# Patient Record
Sex: FEMALE | Race: WHITE | NOT HISPANIC OR LATINO | Employment: OTHER | URBAN - METROPOLITAN AREA
[De-identification: names, ages, dates, MRNs, and addresses within clinical notes are randomized per-mention and may not be internally consistent; named-entity substitution may affect disease eponyms.]

---

## 2023-04-25 ENCOUNTER — OFFICE VISIT (OUTPATIENT)
Dept: PRIMARY CARE | Facility: CLINIC | Age: 88
End: 2023-04-25
Payer: MEDICARE

## 2023-04-25 VITALS
TEMPERATURE: 97.1 F | WEIGHT: 124 LBS | DIASTOLIC BLOOD PRESSURE: 84 MMHG | SYSTOLIC BLOOD PRESSURE: 182 MMHG | BODY MASS INDEX: 24.22 KG/M2

## 2023-04-25 DIAGNOSIS — F32.5 MAJOR DEPRESSIVE DISORDER IN REMISSION, UNSPECIFIED WHETHER RECURRENT (CMS-HCC): ICD-10-CM

## 2023-04-25 DIAGNOSIS — I10 BENIGN HYPERTENSION: ICD-10-CM

## 2023-04-25 DIAGNOSIS — I48.0 PAROXYSMAL ATRIAL FIBRILLATION (MULTI): ICD-10-CM

## 2023-04-25 DIAGNOSIS — E78.5 HYPERLIPIDEMIA, UNSPECIFIED HYPERLIPIDEMIA TYPE: Primary | ICD-10-CM

## 2023-04-25 DIAGNOSIS — B02.29 POST HERPETIC NEURALGIA: ICD-10-CM

## 2023-04-25 DIAGNOSIS — R42 ORTHOSTATIC DIZZINESS: ICD-10-CM

## 2023-04-25 DIAGNOSIS — M81.0 OSTEOPOROSIS, UNSPECIFIED OSTEOPOROSIS TYPE, UNSPECIFIED PATHOLOGICAL FRACTURE PRESENCE: ICD-10-CM

## 2023-04-25 PROBLEM — H81.10 BENIGN POSITIONAL VERTIGO: Status: RESOLVED | Noted: 2023-04-25 | Resolved: 2023-04-25

## 2023-04-25 PROBLEM — R26.89 DECREASED MOBILITY: Status: RESOLVED | Noted: 2023-04-25 | Resolved: 2023-04-25

## 2023-04-25 PROBLEM — E66.3 OVERWEIGHT (BMI 25.0-29.9): Status: ACTIVE | Noted: 2023-04-25

## 2023-04-25 PROCEDURE — 1036F TOBACCO NON-USER: CPT | Performed by: FAMILY MEDICINE

## 2023-04-25 PROCEDURE — 36415 COLL VENOUS BLD VENIPUNCTURE: CPT | Performed by: FAMILY MEDICINE

## 2023-04-25 PROCEDURE — 99214 OFFICE O/P EST MOD 30 MIN: CPT | Performed by: FAMILY MEDICINE

## 2023-04-25 PROCEDURE — 3077F SYST BP >= 140 MM HG: CPT | Performed by: FAMILY MEDICINE

## 2023-04-25 PROCEDURE — 80048 BASIC METABOLIC PNL TOTAL CA: CPT

## 2023-04-25 PROCEDURE — 3079F DIAST BP 80-89 MM HG: CPT | Performed by: FAMILY MEDICINE

## 2023-04-25 PROCEDURE — 85027 COMPLETE CBC AUTOMATED: CPT

## 2023-04-25 RX ORDER — LATANOPROST 50 UG/ML
1 SOLUTION/ DROPS OPHTHALMIC NIGHTLY
COMMUNITY

## 2023-04-25 RX ORDER — SIMVASTATIN 20 MG/1
20 TABLET, FILM COATED ORAL NIGHTLY
COMMUNITY
End: 2023-04-25 | Stop reason: SDUPTHER

## 2023-04-25 RX ORDER — ATENOLOL 50 MG/1
50 TABLET ORAL DAILY
Qty: 90 TABLET | Refills: 3 | Status: SHIPPED | OUTPATIENT
Start: 2023-04-25 | End: 2023-12-06 | Stop reason: SDUPTHER

## 2023-04-25 RX ORDER — BRIMONIDINE TARTRATE AND TIMOLOL MALEATE 2; 5 MG/ML; MG/ML
1 SOLUTION OPHTHALMIC EVERY 12 HOURS SCHEDULED
COMMUNITY
Start: 2023-02-13

## 2023-04-25 RX ORDER — SIMVASTATIN 20 MG/1
20 TABLET, FILM COATED ORAL NIGHTLY
Qty: 90 TABLET | Refills: 3 | Status: SHIPPED | OUTPATIENT
Start: 2023-04-25 | End: 2023-12-05 | Stop reason: SDUPTHER

## 2023-04-25 RX ORDER — PREGABALIN 100 MG/1
1 CAPSULE ORAL 2 TIMES DAILY
COMMUNITY
Start: 2023-01-25 | End: 2023-04-25 | Stop reason: DRUGHIGH

## 2023-04-25 RX ORDER — ATENOLOL 50 MG/1
50 TABLET ORAL DAILY
COMMUNITY
End: 2023-04-25 | Stop reason: SDUPTHER

## 2023-04-25 RX ORDER — PREGABALIN 150 MG/1
150 CAPSULE ORAL 2 TIMES DAILY
Qty: 180 CAPSULE | Refills: 0 | Status: SHIPPED | OUTPATIENT
Start: 2023-04-25 | End: 2023-06-07 | Stop reason: DRUGHIGH

## 2023-04-25 RX ORDER — LISINOPRIL 40 MG/1
40 TABLET ORAL DAILY
COMMUNITY
End: 2023-04-25 | Stop reason: SDUPTHER

## 2023-04-25 RX ORDER — PREGABALIN 100 MG/1
100 CAPSULE ORAL 2 TIMES DAILY
Qty: 90 CAPSULE | Refills: 0 | Status: CANCELLED | OUTPATIENT
Start: 2023-04-25

## 2023-04-25 RX ORDER — LISINOPRIL 40 MG/1
40 TABLET ORAL DAILY
Qty: 90 TABLET | Refills: 3 | Status: SHIPPED | OUTPATIENT
Start: 2023-04-25 | End: 2023-12-05 | Stop reason: SDUPTHER

## 2023-04-25 RX ORDER — WARFARIN SODIUM 5 MG/1
5 TABLET ORAL EVERY EVENING
Qty: 90 TABLET | Refills: 3 | Status: SHIPPED | OUTPATIENT
Start: 2023-04-25 | End: 2023-12-05 | Stop reason: SDUPTHER

## 2023-04-25 RX ORDER — WARFARIN SODIUM 5 MG/1
5 TABLET ORAL
COMMUNITY
End: 2023-04-25 | Stop reason: SDUPTHER

## 2023-04-25 ASSESSMENT — PATIENT HEALTH QUESTIONNAIRE - PHQ9
2. FEELING DOWN, DEPRESSED OR HOPELESS: NOT AT ALL
SUM OF ALL RESPONSES TO PHQ9 QUESTIONS 1 AND 2: 0
1. LITTLE INTEREST OR PLEASURE IN DOING THINGS: NOT AT ALL

## 2023-04-25 NOTE — PROGRESS NOTES
Chief complaint:   Chief Complaint   Patient presents with    Follow-up     3 month       HPI:  Estee Medina is a 87 y.o. female who presents for evaluation of     Home -135/60-70s    She gets anxious when she leaves the house.     Physical exam:  BP (!) 182/84 (BP Location: Left arm, Patient Position: Sitting)   Temp 36.2 °C (97.1 °F)   Wt 56.2 kg (124 lb)   BMI 24.22 kg/m²   General: NAD, well appearing female  Heart: RRR, no mumur appreciated  Lungs: CTAB, no wheezes, rales, rhonchi  Abdomen: soft, non tender, normoactive BS, no organomegaly  Extremities: No LE edema    Assessment/Plan   Problem List Items Addressed This Visit          Nervous    Post herpetic neuralgia     4/25/2023  - increase Lyrica from 100 mg PO BID to 150 mg PO BID  - controlled substance agreement updated today  - last urine drug screen 6/1/2022         Relevant Medications    pregabalin (Lyrica) 150 mg capsule       Circulatory    Paroxysmal atrial fibrillation (CMS/HCC)     4/25/2023  - continue Warfarin and monitoring at the anticoagulation clinic  - CBC ordered today for screening for anemia         Relevant Medications    warfarin (Coumadin) 5 mg tablet    atenolol (Tenormin) 50 mg tablet    Other Relevant Orders    CBC    Benign hypertension     4/25/2023  - elevated in office but at goal at home  - continue current medication therapy  - non fasting BMP ordered today         Relevant Medications    lisinopril 40 mg tablet    atenolol (Tenormin) 50 mg tablet    Other Relevant Orders    Basic metabolic panel       Musculoskeletal    Osteoporosis       Other    Major depressive disorder in remission, unspecified whether recurrent (CMS/HCC)    Hyperlipidemia - Primary     - continue current medication therapy         Relevant Medications    simvastatin (Zocor) 20 mg tablet    Orthostatic dizziness       Breanna Herrera DO

## 2023-04-25 NOTE — ASSESSMENT & PLAN NOTE
4/25/2023  - increase Lyrica from 100 mg PO BID to 150 mg PO BID  - controlled substance agreement updated today  - last urine drug screen 6/1/2022

## 2023-04-25 NOTE — ASSESSMENT & PLAN NOTE
4/25/2023  - elevated in office but at goal at home  - continue current medication therapy  - non fasting BMP ordered today

## 2023-04-25 NOTE — ASSESSMENT & PLAN NOTE
4/25/2023  - continue Warfarin and monitoring at the anticoagulation clinic  - CBC ordered today for screening for anemia

## 2023-04-26 LAB
ANION GAP IN SER/PLAS: 16 MMOL/L (ref 10–20)
CALCIUM (MG/DL) IN SER/PLAS: 9.7 MG/DL (ref 8.6–10.6)
CARBON DIOXIDE, TOTAL (MMOL/L) IN SER/PLAS: 22 MMOL/L (ref 21–32)
CHLORIDE (MMOL/L) IN SER/PLAS: 106 MMOL/L (ref 98–107)
CREATININE (MG/DL) IN SER/PLAS: 0.55 MG/DL (ref 0.5–1.05)
ERYTHROCYTE DISTRIBUTION WIDTH (RATIO) BY AUTOMATED COUNT: 12.7 % (ref 11.5–14.5)
ERYTHROCYTE MEAN CORPUSCULAR HEMOGLOBIN CONCENTRATION (G/DL) BY AUTOMATED: 33.2 G/DL (ref 32–36)
ERYTHROCYTE MEAN CORPUSCULAR VOLUME (FL) BY AUTOMATED COUNT: 95 FL (ref 80–100)
ERYTHROCYTES (10*6/UL) IN BLOOD BY AUTOMATED COUNT: 4.65 X10E12/L (ref 4–5.2)
GFR FEMALE: 89 ML/MIN/1.73M2
GLUCOSE (MG/DL) IN SER/PLAS: 93 MG/DL (ref 74–99)
HEMATOCRIT (%) IN BLOOD BY AUTOMATED COUNT: 44.3 % (ref 36–46)
HEMOGLOBIN (G/DL) IN BLOOD: 14.7 G/DL (ref 12–16)
LEUKOCYTES (10*3/UL) IN BLOOD BY AUTOMATED COUNT: 7 X10E9/L (ref 4.4–11.3)
PLATELETS (10*3/UL) IN BLOOD AUTOMATED COUNT: 253 X10E9/L (ref 150–450)
POTASSIUM (MMOL/L) IN SER/PLAS: 4.2 MMOL/L (ref 3.5–5.3)
SODIUM (MMOL/L) IN SER/PLAS: 140 MMOL/L (ref 136–145)
UREA NITROGEN (MG/DL) IN SER/PLAS: 16 MG/DL (ref 6–23)

## 2023-04-27 ENCOUNTER — TELEPHONE (OUTPATIENT)
Dept: PRIMARY CARE | Facility: CLINIC | Age: 88
End: 2023-04-27
Payer: MEDICARE

## 2023-04-27 NOTE — TELEPHONE ENCOUNTER
----- Message from Breanna Herrera DO sent at 4/27/2023  8:16 AM EDT -----  Labs look good- no anemia, normal kidney/liver function, electrolytes, blood sugar

## 2023-05-18 ENCOUNTER — TELEPHONE (OUTPATIENT)
Dept: PRIMARY CARE | Facility: CLINIC | Age: 88
End: 2023-05-18
Payer: MEDICARE

## 2023-05-18 NOTE — TELEPHONE ENCOUNTER
Pt is calling she states [facility] contacted her and told her she needs a Prevnar 20 pnemonia vaccine, Pt states that she has had these pnemonia vaccines.    Pnemovax in 2008  Prevnar 13 in 2016     Pt would like to know if you think she should get the Prevnar 20? Please let her know thank you.

## 2023-06-06 ENCOUNTER — TELEPHONE (OUTPATIENT)
Dept: PRIMARY CARE | Facility: CLINIC | Age: 88
End: 2023-06-06
Payer: MEDICARE

## 2023-06-06 NOTE — TELEPHONE ENCOUNTER
She was seen 04/25/23 and her Lyrica was increased. She says she has had swelling in her lower legs and ankles since then. What should she do? Please call.

## 2023-06-07 ENCOUNTER — OFFICE VISIT (OUTPATIENT)
Dept: PRIMARY CARE | Facility: CLINIC | Age: 88
End: 2023-06-07
Payer: MEDICARE

## 2023-06-07 ENCOUNTER — TELEPHONE (OUTPATIENT)
Dept: PRIMARY CARE | Facility: CLINIC | Age: 88
End: 2023-06-07

## 2023-06-07 VITALS
DIASTOLIC BLOOD PRESSURE: 76 MMHG | TEMPERATURE: 97.8 F | BODY MASS INDEX: 25.97 KG/M2 | WEIGHT: 133 LBS | SYSTOLIC BLOOD PRESSURE: 160 MMHG

## 2023-06-07 DIAGNOSIS — B02.29 POST HERPETIC NEURALGIA: Primary | ICD-10-CM

## 2023-06-07 DIAGNOSIS — R60.0 LOCALIZED EDEMA: ICD-10-CM

## 2023-06-07 LAB
POC APPEARANCE, URINE: CLEAR
POC BILIRUBIN, URINE: NEGATIVE
POC BLOOD, URINE: NEGATIVE
POC COLOR, URINE: YELLOW
POC GLUCOSE, URINE: NEGATIVE MG/DL
POC KETONES, URINE: NEGATIVE MG/DL
POC LEUKOCYTES, URINE: NEGATIVE
POC NITRITE,URINE: NEGATIVE
POC PH, URINE: 6 PH
POC PROTEIN, URINE: NEGATIVE MG/DL
POC SPECIFIC GRAVITY, URINE: 1.01
POC UROBILINOGEN, URINE: 0.2 EU/DL

## 2023-06-07 PROCEDURE — 80053 COMPREHEN METABOLIC PANEL: CPT

## 2023-06-07 PROCEDURE — 3077F SYST BP >= 140 MM HG: CPT | Performed by: FAMILY MEDICINE

## 2023-06-07 PROCEDURE — 3078F DIAST BP <80 MM HG: CPT | Performed by: FAMILY MEDICINE

## 2023-06-07 PROCEDURE — 1036F TOBACCO NON-USER: CPT | Performed by: FAMILY MEDICINE

## 2023-06-07 PROCEDURE — 81003 URINALYSIS AUTO W/O SCOPE: CPT | Performed by: FAMILY MEDICINE

## 2023-06-07 PROCEDURE — 1159F MED LIST DOCD IN RCRD: CPT | Performed by: FAMILY MEDICINE

## 2023-06-07 PROCEDURE — 85027 COMPLETE CBC AUTOMATED: CPT

## 2023-06-07 PROCEDURE — 99214 OFFICE O/P EST MOD 30 MIN: CPT | Performed by: FAMILY MEDICINE

## 2023-06-07 PROCEDURE — 84443 ASSAY THYROID STIM HORMONE: CPT

## 2023-06-07 RX ORDER — PREGABALIN 100 MG/1
100 CAPSULE ORAL 2 TIMES DAILY
Qty: 180 CAPSULE | Refills: 0 | Status: SHIPPED | OUTPATIENT
Start: 2023-06-07 | End: 2023-07-25

## 2023-06-07 ASSESSMENT — PATIENT HEALTH QUESTIONNAIRE - PHQ9
2. FEELING DOWN, DEPRESSED OR HOPELESS: NOT AT ALL
1. LITTLE INTEREST OR PLEASURE IN DOING THINGS: NOT AT ALL
SUM OF ALL RESPONSES TO PHQ9 QUESTIONS 1 AND 2: 0

## 2023-06-07 NOTE — PROGRESS NOTES
Chief complaint:   Chief Complaint   Patient presents with    Joint Swelling       HPI:  Estee Medina is a 88 y.o. female who presents for evaluation of leg edema which has slowly worsened since increase in Lyrica from 100 mg PO BID to 150 mg PO BID. No chest pain, SOB, coughing, change in urination, abdominal pain, swelling anywhere else, or GI sx. Her swelling is worse starting in the afternoon and improved overnight.    Physical exam:  /76 (BP Location: Left arm, Patient Position: Sitting)   Temp 36.6 °C (97.8 °F)   Wt 60.3 kg (133 lb)   BMI 25.97 kg/m²   General: NAD, well appearing female  Heart: RRR, no mumur appreciated  Lungs: CTAB, no wheezes, rales, rhonchi  Abdomen: soft, non tender, normoactive BS, no organomegaly  Extremities: Bilateral LE edema +2    Assessment/Plan   Problem List Items Addressed This Visit          Nervous    Post herpetic neuralgia - Primary    Relevant Medications    pregabalin (Lyrica) 100 mg capsule     Other Visit Diagnoses       Localized edema        Relevant Orders    POCT UA Automated manually resulted (Completed)    Comprehensive metabolic panel    CBC    Tsh With Reflex To Free T4 If Abnormal        Decrease dose back to 100 mg PO BID of Lyrica. Discussed alternative potential causes of swelling. She has no systemic sx but will order labs and reviewed UA which was wnl today.    Breanna Herrera DO

## 2023-06-08 LAB
ALANINE AMINOTRANSFERASE (SGPT) (U/L) IN SER/PLAS: 16 U/L (ref 7–45)
ALBUMIN (G/DL) IN SER/PLAS: 4.2 G/DL (ref 3.4–5)
ALKALINE PHOSPHATASE (U/L) IN SER/PLAS: 82 U/L (ref 33–136)
ANION GAP IN SER/PLAS: 13 MMOL/L (ref 10–20)
ASPARTATE AMINOTRANSFERASE (SGOT) (U/L) IN SER/PLAS: 21 U/L (ref 9–39)
BILIRUBIN TOTAL (MG/DL) IN SER/PLAS: 0.6 MG/DL (ref 0–1.2)
CALCIUM (MG/DL) IN SER/PLAS: 9.5 MG/DL (ref 8.6–10.6)
CARBON DIOXIDE, TOTAL (MMOL/L) IN SER/PLAS: 22 MMOL/L (ref 21–32)
CHLORIDE (MMOL/L) IN SER/PLAS: 108 MMOL/L (ref 98–107)
CREATININE (MG/DL) IN SER/PLAS: 0.57 MG/DL (ref 0.5–1.05)
ERYTHROCYTE DISTRIBUTION WIDTH (RATIO) BY AUTOMATED COUNT: 13.2 % (ref 11.5–14.5)
ERYTHROCYTE MEAN CORPUSCULAR HEMOGLOBIN CONCENTRATION (G/DL) BY AUTOMATED: 32.3 G/DL (ref 32–36)
ERYTHROCYTE MEAN CORPUSCULAR VOLUME (FL) BY AUTOMATED COUNT: 95 FL (ref 80–100)
ERYTHROCYTES (10*6/UL) IN BLOOD BY AUTOMATED COUNT: 4.57 X10E12/L (ref 4–5.2)
GFR FEMALE: 88 ML/MIN/1.73M2
GLUCOSE (MG/DL) IN SER/PLAS: 89 MG/DL (ref 74–99)
HEMATOCRIT (%) IN BLOOD BY AUTOMATED COUNT: 43.4 % (ref 36–46)
HEMOGLOBIN (G/DL) IN BLOOD: 14 G/DL (ref 12–16)
LEUKOCYTES (10*3/UL) IN BLOOD BY AUTOMATED COUNT: 5.5 X10E9/L (ref 4.4–11.3)
PLATELETS (10*3/UL) IN BLOOD AUTOMATED COUNT: 234 X10E9/L (ref 150–450)
POTASSIUM (MMOL/L) IN SER/PLAS: 4.3 MMOL/L (ref 3.5–5.3)
PROTEIN TOTAL: 6.4 G/DL (ref 6.4–8.2)
SODIUM (MMOL/L) IN SER/PLAS: 139 MMOL/L (ref 136–145)
UREA NITROGEN (MG/DL) IN SER/PLAS: 16 MG/DL (ref 6–23)

## 2023-06-09 ENCOUNTER — TELEPHONE (OUTPATIENT)
Dept: PRIMARY CARE | Facility: CLINIC | Age: 88
End: 2023-06-09
Payer: MEDICARE

## 2023-06-09 LAB — THYROTROPIN (MIU/L) IN SER/PLAS BY DETECTION LIMIT <= 0.05 MIU/L: 1.09 MIU/L (ref 0.44–3.98)

## 2023-06-16 ENCOUNTER — TELEPHONE (OUTPATIENT)
Dept: PRIMARY CARE | Facility: CLINIC | Age: 88
End: 2023-06-16
Payer: MEDICARE

## 2023-06-16 NOTE — TELEPHONE ENCOUNTER
----- Message from Linnette Bailey sent at 6/16/2023  8:18 AM EDT -----      ----- Message -----  From: Breanna Herrera DO  Sent: 6/13/2023   5:26 PM EDT  To: Anita Edwards MA    Thyroid level ok

## 2023-06-26 PROBLEM — F32.A ANXIETY AND DEPRESSION: Status: ACTIVE | Noted: 2023-06-26

## 2023-06-26 PROBLEM — F41.9 ANXIETY AND DEPRESSION: Status: ACTIVE | Noted: 2023-06-26

## 2023-07-25 ENCOUNTER — OFFICE VISIT (OUTPATIENT)
Dept: PRIMARY CARE | Facility: CLINIC | Age: 88
End: 2023-07-25
Payer: MEDICARE

## 2023-07-25 VITALS — DIASTOLIC BLOOD PRESSURE: 70 MMHG | SYSTOLIC BLOOD PRESSURE: 140 MMHG

## 2023-07-25 DIAGNOSIS — H92.09 EAR ACHE: Primary | ICD-10-CM

## 2023-07-25 DIAGNOSIS — B02.29 POST HERPETIC NEURALGIA: ICD-10-CM

## 2023-07-25 PROCEDURE — 99213 OFFICE O/P EST LOW 20 MIN: CPT | Performed by: FAMILY MEDICINE

## 2023-07-25 PROCEDURE — 3078F DIAST BP <80 MM HG: CPT | Performed by: FAMILY MEDICINE

## 2023-07-25 PROCEDURE — 1159F MED LIST DOCD IN RCRD: CPT | Performed by: FAMILY MEDICINE

## 2023-07-25 PROCEDURE — 1036F TOBACCO NON-USER: CPT | Performed by: FAMILY MEDICINE

## 2023-07-25 PROCEDURE — 3077F SYST BP >= 140 MM HG: CPT | Performed by: FAMILY MEDICINE

## 2023-07-25 RX ORDER — PREGABALIN 100 MG/1
100 CAPSULE ORAL 2 TIMES DAILY
Qty: 180 CAPSULE | Refills: 0
Start: 2023-07-25 | End: 2023-09-16 | Stop reason: DRUGHIGH

## 2023-07-29 NOTE — PROGRESS NOTES
Chief complaint:   Chief Complaint   Patient presents with    Follow-up     Ankle swelling    Earache     Right ear       HPI:  Estee Medina is a 88 y.o. female who presents for evaluation of lyrica. She notes some improvement with the higher dose but had some leg swelling which resolved when the dose was again decreased. She also has right earache.     Physical exam:  /70   General: NAD, well appearing female  Ears: TM normal bilaterally  Heart: RRR, no mumur appreciated  Lungs: CTAB, no wheezes, rales, rhonchi  Abdomen: soft, non tender, normoactive BS, no organomegaly  Extremities: No LE edema    Assessment/Plan   Problem List Items Addressed This Visit       Post herpetic neuralgia    Relevant Medications    pregabalin (Lyrica) 100 mg capsule     Other Visit Diagnoses       Ear ache    -  Primary        Reassurance provided for her ear ache  Will continue Lyrica at 100 mg PO daily and she will try Tylenol. If that is not helping, advised her she can call and we can again trial an increase but if her swelling returns will recommend decreasing again.     Breanna Herrera, DO

## 2023-09-16 ENCOUNTER — TELEPHONE (OUTPATIENT)
Dept: PRIMARY CARE | Facility: CLINIC | Age: 88
End: 2023-09-16
Payer: MEDICARE

## 2023-09-16 DIAGNOSIS — B02.29 POST HERPETIC NEURALGIA: Primary | ICD-10-CM

## 2023-09-16 RX ORDER — PREGABALIN 150 MG/1
150 CAPSULE ORAL 2 TIMES DAILY
Qty: 180 CAPSULE | Refills: 0 | Status: SHIPPED | OUTPATIENT
Start: 2023-09-16 | End: 2023-12-05 | Stop reason: SDUPTHER

## 2023-09-16 NOTE — TELEPHONE ENCOUNTER
Pt requesting a refill of Lyrica 150mg, Pt states that the Jerri 100mg was not helping with her pain, Pt states that at her last appt she had discussed with you about going back to the 150mg due to her pain not being controlled on the 100mg     Please call Pt once this has been sent in    Lyrica 150mg BID  [phone]  Last refill 4/25/23 for 150mg and 7/25/23 for 100mg  Last appt 7/25/23  Next appt 10/25/23

## 2023-09-22 DIAGNOSIS — I48.91 ATRIAL FIBRILLATION, UNSPECIFIED TYPE (MULTI): Primary | ICD-10-CM

## 2023-09-22 LAB
INR IN PPP BY COAGULATION ASSAY EXTERNAL: 2.8
PROTHROMBIN TIME (PT) IN PPP BY COAGULATION ASSAY EXTERNAL: NORMAL SECONDS

## 2023-10-16 ENCOUNTER — APPOINTMENT (OUTPATIENT)
Dept: RADIOLOGY | Facility: HOSPITAL | Age: 88
End: 2023-10-16
Payer: MEDICARE

## 2023-10-16 ENCOUNTER — HOSPITAL ENCOUNTER (EMERGENCY)
Facility: HOSPITAL | Age: 88
Discharge: HOME | End: 2023-10-17
Attending: STUDENT IN AN ORGANIZED HEALTH CARE EDUCATION/TRAINING PROGRAM
Payer: MEDICARE

## 2023-10-16 VITALS
OXYGEN SATURATION: 97 % | SYSTOLIC BLOOD PRESSURE: 179 MMHG | HEART RATE: 74 BPM | DIASTOLIC BLOOD PRESSURE: 97 MMHG | RESPIRATION RATE: 18 BRPM | TEMPERATURE: 97 F | HEIGHT: 60 IN | BODY MASS INDEX: 26.5 KG/M2 | WEIGHT: 135 LBS

## 2023-10-16 DIAGNOSIS — I10 UNCONTROLLED HYPERTENSION: Primary | ICD-10-CM
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
## 2023-10-16 DIAGNOSIS — S42.291A OTHER CLOSED DISPLACED FRACTURE OF PROXIMAL END OF RIGHT HUMERUS, INITIAL ENCOUNTER: ICD-10-CM

## 2023-10-16 PROCEDURE — 29105 APPLICATION LONG ARM SPLINT: CPT | Mod: RT

## 2023-10-16 PROCEDURE — 70450 CT HEAD/BRAIN W/O DYE: CPT | Performed by: RADIOLOGY

## 2023-10-16 PROCEDURE — 72125 CT NECK SPINE W/O DYE: CPT | Performed by: RADIOLOGY

## 2023-10-16 PROCEDURE — 73060 X-RAY EXAM OF HUMERUS: CPT | Mod: RIGHT SIDE | Performed by: RADIOLOGY

## 2023-10-16 PROCEDURE — 73030 X-RAY EXAM OF SHOULDER: CPT | Mod: RIGHT SIDE | Performed by: RADIOLOGY

## 2023-10-16 PROCEDURE — 99285 EMERGENCY DEPT VISIT HI MDM: CPT | Performed by: STUDENT IN AN ORGANIZED HEALTH CARE EDUCATION/TRAINING PROGRAM

## 2023-10-16 PROCEDURE — S0119 ONDANSETRON 4 MG: HCPCS | Performed by: STUDENT IN AN ORGANIZED HEALTH CARE EDUCATION/TRAINING PROGRAM

## 2023-10-16 PROCEDURE — 73060 X-RAY EXAM OF HUMERUS: CPT | Mod: RT

## 2023-10-16 PROCEDURE — 72125 CT NECK SPINE W/O DYE: CPT

## 2023-10-16 PROCEDURE — 2500000005 HC RX 250 GENERAL PHARMACY W/O HCPCS: Performed by: STUDENT IN AN ORGANIZED HEALTH CARE EDUCATION/TRAINING PROGRAM

## 2023-10-16 PROCEDURE — 70450 CT HEAD/BRAIN W/O DYE: CPT

## 2023-10-16 PROCEDURE — 2500000004 HC RX 250 GENERAL PHARMACY W/ HCPCS (ALT 636 FOR OP/ED): Performed by: STUDENT IN AN ORGANIZED HEALTH CARE EDUCATION/TRAINING PROGRAM

## 2023-10-16 PROCEDURE — 73030 X-RAY EXAM OF SHOULDER: CPT | Mod: RT,FY

## 2023-10-16 PROCEDURE — 99285 EMERGENCY DEPT VISIT HI MDM: CPT | Mod: 25

## 2023-10-16 RX ORDER — ONDANSETRON 4 MG/1
4 TABLET, ORALLY DISINTEGRATING ORAL ONCE
Status: COMPLETED | OUTPATIENT
Start: 2023-10-16 | End: 2023-10-16

## 2023-10-16 RX ADMIN — ONDANSETRON 4 MG: 4 TABLET, ORALLY DISINTEGRATING ORAL at 22:35

## 2023-10-16 ASSESSMENT — LIFESTYLE VARIABLES
HAVE YOU EVER FELT YOU SHOULD CUT DOWN ON YOUR DRINKING: NO
HAVE PEOPLE ANNOYED YOU BY CRITICIZING YOUR DRINKING: NO
EVER HAD A DRINK FIRST THING IN THE MORNING TO STEADY YOUR NERVES TO GET RID OF A HANGOVER: NO
EVER FELT BAD OR GUILTY ABOUT YOUR DRINKING: NO
REASON UNABLE TO ASSESS: NO

## 2023-10-16 ASSESSMENT — COLUMBIA-SUICIDE SEVERITY RATING SCALE - C-SSRS
1. IN THE PAST MONTH, HAVE YOU WISHED YOU WERE DEAD OR WISHED YOU COULD GO TO SLEEP AND NOT WAKE UP?: NO
6. HAVE YOU EVER DONE ANYTHING, STARTED TO DO ANYTHING, OR PREPARED TO DO ANYTHING TO END YOUR LIFE?: NO
2. HAVE YOU ACTUALLY HAD ANY THOUGHTS OF KILLING YOURSELF?: NO

## 2023-10-16 ASSESSMENT — PAIN - FUNCTIONAL ASSESSMENT: PAIN_FUNCTIONAL_ASSESSMENT: 0-10

## 2023-10-16 ASSESSMENT — PAIN SCALES - GENERAL: PAINLEVEL_OUTOF10: 9

## 2023-10-17 ENCOUNTER — TELEPHONE (OUTPATIENT)
Dept: PRIMARY CARE | Facility: CLINIC | Age: 88
End: 2023-10-17
Payer: MEDICARE

## 2023-10-17 DIAGNOSIS — S42.201S CLOSED FRACTURE OF PROXIMAL END OF RIGHT HUMERUS, UNSPECIFIED FRACTURE MORPHOLOGY, SEQUELA: Primary | ICD-10-CM

## 2023-10-17 RX ORDER — ACETAMINOPHEN 325 MG/1
975 TABLET ORAL ONCE
Status: COMPLETED | OUTPATIENT
Start: 2023-10-17 | End: 2023-10-17

## 2023-10-17 RX ADMIN — ACETAMINOPHEN 975 MG: 325 TABLET ORAL at 00:36

## 2023-10-17 ASSESSMENT — PAIN SCALES - GENERAL: PAINLEVEL_OUTOF10: 8

## 2023-10-17 NOTE — TELEPHONE ENCOUNTER
NT pt-   She fell and broke her shoulder 10/16/23. She saw the ortho.    Daughter is requesting home health and order for walker.

## 2023-10-17 NOTE — ED PROVIDER NOTES
HPI   Chief Complaint   Patient presents with    Fall     Right shoulder pain       88-year-old female with history of paroxysmal A-fib on Coumadin, HTN, HLD, and anxiety/depression presenting to [facility] ED with a complaint of a fall.  She reports she stood up from her couch and tripped on her pillow landing on the side of her right arm at approximately 5 PM today.  Denies hitting her head or losing consciousness.  States that she was unable to get up in which her daughter was not able to reach her on the phone and called the ambulance. States she has felt nauseous after the fall but no vomiting. Denies lightheadedness, chest pain, confusion, or weakness.      History provided by:  Patient                      [place] Coma Scale Score: 15                  Patient History   Past Medical History:   Diagnosis Date    Compression fracture of lumbar vertebra (CMS/HCC) 12/08/2015    Neuralgia and neuritis, unspecified 07/07/2020    Acute neuritis    Personal history of (healed) traumatic fracture     History of fracture of upper extremity    Personal history of other (healed) physical injury and trauma 06/02/2016    History of insect sting    Personal history of other diseases of the nervous system and sense organs 09/06/2019    History of cataract    Personal history of other infectious and parasitic diseases 04/28/2021    History of herpes zoster     Past Surgical History:   Procedure Laterality Date    OTHER SURGICAL HISTORY  01/08/2019    Cataract surgery    OTHER SURGICAL HISTORY  01/08/2019    Arm surgery    OTHER SURGICAL HISTORY  01/08/2019    Tonsillectomy    OTHER SURGICAL HISTORY  01/08/2019    [place] tooth extraction     Family History   Problem Relation Name Age of Onset    Other (malignant neoplam) Mother      Diabetes Father      Diabetes Sister      Other (cardiac disorder) Sister      Alcohol abuse Daughter       Social History     Tobacco Use    Smoking status: Never     Passive exposure: Never     Smokeless tobacco: Never   Substance Use Topics    Alcohol use: Yes    Drug use: Never       Physical Exam   ED Triage Vitals [10/16/23 2152]   Temp Heart Rate Resp BP   36.1 °C (97 °F) 69 16 (!) 202/104      SpO2 Temp src Heart Rate Source Patient Position   95 % -- -- --      BP Location FiO2 (%)     -- --       Physical Exam  Vitals and nursing note reviewed.   Constitutional:       General: She is awake. She is not in acute distress.     Appearance: Normal appearance. She is well-developed.   HENT:      Head: Normocephalic and atraumatic.      Right Ear: External ear normal.      Left Ear: External ear normal.      Nose: Nose normal. No congestion or rhinorrhea.      Mouth/Throat:      Mouth: Mucous membranes are moist.      Pharynx: Oropharynx is clear. No posterior oropharyngeal erythema.   Eyes:      General: No scleral icterus.        Right eye: No discharge.         Left eye: No discharge.      Extraocular Movements: Extraocular movements intact.      Conjunctiva/sclera: Conjunctivae normal.   Cardiovascular:      Rate and Rhythm: Normal rate and regular rhythm.      Pulses: Normal pulses.           Radial pulses are 2+ on the right side and 2+ on the left side.      Heart sounds: Normal heart sounds. No murmur heard.     No friction rub.   Pulmonary:      Effort: Pulmonary effort is normal. No respiratory distress.      Breath sounds: Normal breath sounds. No stridor. No wheezing or rales.   Abdominal:      General: There is no distension.      Palpations: Abdomen is soft.      Tenderness: There is no abdominal tenderness. There is no right CVA tenderness, left CVA tenderness, guarding or rebound.   Musculoskeletal:         General: No swelling.      Right shoulder: Tenderness and bony tenderness present.      Cervical back: Normal range of motion and neck supple.      Right lower leg: No edema.      Left lower leg: No edema.   Skin:     General: Skin is warm and dry.      Capillary Refill: Capillary  refill takes less than 2 seconds.      Coloration: Skin is not jaundiced or pale.      Findings: No lesion or rash.   Neurological:      General: No focal deficit present.      Mental Status: She is alert and oriented to person, place, and time. Mental status is at baseline.      Cranial Nerves: No cranial nerve deficit.      Sensory: No sensory deficit.      Motor: No weakness.   Psychiatric:         Mood and Affect: Mood normal.         Behavior: Behavior normal. Behavior is cooperative.         Thought Content: Thought content normal.         Judgment: Judgment normal.         ED Course & MDM   ED Course as of 10/17/23 0025   Mon Oct 16, 2023   4197 Spoke with orthopedic surgeon, Dr. Regalado, who recommended following-up outpatient. Reports she is a poor surgical candidate considering age and on coumadin for paroxysmal Afib. [ES]      ED Course User Index  [ES] Zay Sofia MD         Diagnoses as of 10/17/23 0025   Uncontrolled hypertension   Fall, initial encounter   Other closed displaced fracture of proximal end of right humerus, initial encounter       Medical Decision Making  88-year-old female with history mentioned above presenting to the ED with a complaint of a fall and right arm pain.    Patient is hypertensive at 202/104 but otherwise afebrile, saturating well on room air, and in no acute distress.  Physical exam findings mentioned above.  X-ray of the right shoulder, right humerus, and CT head and C-spine without contrast ordered.  Zofran provided.  Repeat blood pressure at 174/90.  Patient notes she took her blood pressure medications this morning. Right upper extremity neurovascularly intact. 2+ radial pulses.  Patient does not wish for morphine but states she would take Tylenol if need be.    My indpendent interpretation of imaging:  CT head and C-spine unimpressive for hemorrhage, fracture, dislocation, or edema.  X-ray of the right shoulder reveals a comminuted proximal humeral surgical neck  fracture.    Reassessment:  Updated patient on findings.  Patient placed in a coaptation splint with a shoulder sling.  Radial pulses, sensation, and  strength intact.    Disposition: Discussed with the patient and daughter at bedside who agreed with discharge.  She will utilize Tylenol for pain.  She will follow-up with the orthopedic surgeon provided tomorrow.  Strict return precautions provided.        Procedure  Procedures     Zay Sofia MD  Resident  10/17/23 0036

## 2023-10-17 NOTE — DISCHARGE INSTRUCTIONS
Seek immediate medical attention should you have:  Uncontrolled pain, numbness, shortness of breath, chest pain, weakness, confusion, vomiting, or any worsening or concerning symptoms.

## 2023-10-18 NOTE — TELEPHONE ENCOUNTER
Home Health orders need a face to face physician visit within 30 days per insurance guidelines, so pt would need an office visit, or have them call Ortho again,    Thank you,    Kymberly Mckee, DO

## 2023-10-18 NOTE — TELEPHONE ENCOUNTER
Discussed with luz elena. Luz Elena stated that she needed the patient to be seen today and is unsure how she is able to get the patient to the car. Luz Elena was rude and raising her voice in frustration to get the patient seen quickly. I let luz elena know that Dr. Mckee is booked and that Dr. Herrera will be back in next week. Luz Elena was very demanding. I scheduled the patient with dr. Marcus tomorrow at 950am.

## 2023-10-18 NOTE — TELEPHONE ENCOUNTER
I spoke with Luz Elena. She did ask them yesterday. She was told that ins won't cover anything for the diagnosis of a broken shoulder. They wanted her to contact her PCP for orders. Luz Elena stated that her mother probably needs help because she is elderly and now and the broken shoulder.

## 2023-10-19 ENCOUNTER — OFFICE VISIT (OUTPATIENT)
Dept: PRIMARY CARE | Facility: CLINIC | Age: 88
End: 2023-10-19
Payer: MEDICARE

## 2023-10-19 ENCOUNTER — HOME HEALTH ADMISSION (OUTPATIENT)
Dept: HOME HEALTH SERVICES | Facility: HOME HEALTH | Age: 88
End: 2023-10-19
Payer: MEDICARE

## 2023-10-19 VITALS — TEMPERATURE: 97.2 F | DIASTOLIC BLOOD PRESSURE: 64 MMHG | SYSTOLIC BLOOD PRESSURE: 108 MMHG

## 2023-10-19 DIAGNOSIS — I48.91 ATRIAL FIBRILLATION, UNSPECIFIED TYPE (MULTI): ICD-10-CM

## 2023-10-19 DIAGNOSIS — B02.29 POST HERPETIC NEURALGIA: ICD-10-CM

## 2023-10-19 DIAGNOSIS — R53.1 GENERALIZED WEAKNESS: ICD-10-CM

## 2023-10-19 DIAGNOSIS — S42.201S CLOSED FRACTURE OF PROXIMAL END OF RIGHT HUMERUS, UNSPECIFIED FRACTURE MORPHOLOGY, SEQUELA: Primary | ICD-10-CM

## 2023-10-19 DIAGNOSIS — E78.5 HYPERLIPIDEMIA, UNSPECIFIED HYPERLIPIDEMIA TYPE: ICD-10-CM

## 2023-10-19 PROCEDURE — 1159F MED LIST DOCD IN RCRD: CPT | Performed by: FAMILY MEDICINE

## 2023-10-19 PROCEDURE — 1125F AMNT PAIN NOTED PAIN PRSNT: CPT | Performed by: FAMILY MEDICINE

## 2023-10-19 PROCEDURE — 1036F TOBACCO NON-USER: CPT | Performed by: FAMILY MEDICINE

## 2023-10-19 PROCEDURE — 3078F DIAST BP <80 MM HG: CPT | Performed by: FAMILY MEDICINE

## 2023-10-19 PROCEDURE — 1160F RVW MEDS BY RX/DR IN RCRD: CPT | Performed by: FAMILY MEDICINE

## 2023-10-19 PROCEDURE — 3074F SYST BP LT 130 MM HG: CPT | Performed by: FAMILY MEDICINE

## 2023-10-19 PROCEDURE — 99214 OFFICE O/P EST MOD 30 MIN: CPT | Performed by: FAMILY MEDICINE

## 2023-10-19 NOTE — PATIENT INSTRUCTIONS
I will order home health care.  I will order a hospital bed.  Follow up with Dr Breanna Herrera in one week.  If you feel unsafe at home due to your debilitated physical condition, go to the ER.

## 2023-10-19 NOTE — PROGRESS NOTES
Subjective   Patient ID: [id number]     Estee Medina is a 88 y.o. female who presents for Home Care Orders.  HPI  She is here for home health care orders.      She was in the ER for a fall on 10/16/23.  ER report from that date was reviewed today.    She sustained a fracture of her right humerus.  She was given a sling.  They spoke to orthopedics and she was not thought to be a good candidate for surgery. Surgery was offered but they thought against it.    She is requesting an order for home health care.      She is here with her daughter.  She is having difficulty with transferring her.  She has weakness in the legs and a fractured right shoulder.      CT brain neg.  The right shoulder was the only thing significantly injured with this fall.     She has trouble with self care and her daughter is doing her best but does not feel comfortable with transfers.      They are asking for home health care.    She has a stability bar for her bed but she can't grab it.  She has a walker but that is difficult with her shoulder fracture.      Objective     /64 (BP Location: Left arm, Patient Position: Sitting)   Temp 36.2 °C (97.2 °F) (Skin)      Physical Exam  Constitutional:       Appearance: Normal appearance.   Cardiovascular:      Rate and Rhythm: Normal rate and regular rhythm.      Heart sounds: Normal heart sounds.   Pulmonary:      Effort: Pulmonary effort is normal.      Breath sounds: Normal breath sounds.   Musculoskeletal:      Comments: Fractured right humerus.  Wearing a right shoulder sling.   Neurological:      General: No focal deficit present.      Mental Status: She is alert.      Motor: Weakness present.      Gait: Gait abnormal.         Assessment/Plan   Problem List Items Addressed This Visit       Hyperlipidemia    Relevant Orders    Referral to Home Care    Hospital Bed    Post herpetic neuralgia    Relevant Orders    Referral to Home Care    Hospital Bed    Atrial fibrillation (CMS/[facility])     Relevant Orders    Referral to Home Care    Hospital Bed     Other Visit Diagnoses       Closed fracture of proximal end of right humerus, unspecified fracture morphology, sequela    -  Primary    Relevant Orders    Referral to Home Care    Hospital Bed    Generalized weakness        Relevant Orders    Referral to Home Care    Hospital Bed        I will order home health care.  I will order a hospital bed.  Follow up with Dr Breanna Herrera in one week.  If you feel unsafe at home due to your debilitated physical condition, go to the ER.      George Marcus, DO

## 2023-10-20 ENCOUNTER — APPOINTMENT (OUTPATIENT)
Dept: CARDIOLOGY | Facility: CLINIC | Age: 88
End: 2023-10-20
Payer: MEDICARE

## 2023-10-20 ENCOUNTER — TELEPHONE (OUTPATIENT)
Dept: CARDIOLOGY | Facility: CLINIC | Age: 88
End: 2023-10-20
Payer: MEDICARE

## 2023-10-20 NOTE — TELEPHONE ENCOUNTER
Pt's daughter called [facility] clinic on 10/19 and left a VM stating that Pt broke her arm/elbow and that they are cancelling her INR appt. For 10/20.  She stated that they would call to r/s a new follow up appt.

## 2023-10-22 ENCOUNTER — HOME CARE VISIT (OUTPATIENT)
Dept: HOME HEALTH SERVICES | Facility: HOME HEALTH | Age: 88
End: 2023-10-22
Payer: MEDICARE

## 2023-10-22 VITALS
SYSTOLIC BLOOD PRESSURE: 122 MMHG | RESPIRATION RATE: 18 BRPM | OXYGEN SATURATION: 96 % | DIASTOLIC BLOOD PRESSURE: 68 MMHG | BODY MASS INDEX: 25.52 KG/M2 | HEIGHT: 60 IN | HEART RATE: 70 BPM | TEMPERATURE: 98.1 F | WEIGHT: 130 LBS

## 2023-10-22 DIAGNOSIS — E78.5 HYPERLIPIDEMIA, UNSPECIFIED HYPERLIPIDEMIA TYPE: ICD-10-CM

## 2023-10-22 DIAGNOSIS — B02.29 POST HERPETIC NEURALGIA: ICD-10-CM

## 2023-10-22 DIAGNOSIS — I48.91 ATRIAL FIBRILLATION, UNSPECIFIED TYPE (MULTI): ICD-10-CM

## 2023-10-22 DIAGNOSIS — S42.201S CLOSED FRACTURE OF PROXIMAL END OF RIGHT HUMERUS, UNSPECIFIED FRACTURE MORPHOLOGY, SEQUELA: ICD-10-CM

## 2023-10-22 DIAGNOSIS — R53.1 GENERALIZED WEAKNESS: ICD-10-CM

## 2023-10-22 PROCEDURE — 1090000002 HH PPS REVENUE DEBIT

## 2023-10-22 PROCEDURE — G0299 HHS/HOSPICE OF RN EA 15 MIN: HCPCS | Mod: HHH

## 2023-10-22 PROCEDURE — 0023 HH SOC

## 2023-10-22 PROCEDURE — 1090000001 HH PPS REVENUE CREDIT

## 2023-10-22 PROCEDURE — 169592 NO-PAY CLAIM PROCEDURE

## 2023-10-22 ASSESSMENT — ACTIVITIES OF DAILY LIVING (ADL)
ENTERING_EXITING_HOME: MODERATE ASSIST
OASIS_M1830: 05

## 2023-10-22 ASSESSMENT — ENCOUNTER SYMPTOMS
PAIN LOCATION - PAIN FREQUENCY: INTERMITTENT
CHANGE IN APPETITE: UNCHANGED
PAIN LOCATION - PAIN SEVERITY: 8/10
MUSCLE WEAKNESS: 1
PAIN LOCATION - PAIN QUALITY: THROBBING
APPETITE LEVEL: FAIR

## 2023-10-23 ENCOUNTER — HOME CARE VISIT (OUTPATIENT)
Dept: HOME HEALTH SERVICES | Facility: HOME HEALTH | Age: 88
End: 2023-10-23
Payer: MEDICARE

## 2023-10-23 PROCEDURE — 1090000001 HH PPS REVENUE CREDIT

## 2023-10-23 PROCEDURE — 1090000002 HH PPS REVENUE DEBIT

## 2023-10-24 ENCOUNTER — HOME CARE VISIT (OUTPATIENT)
Dept: HOME HEALTH SERVICES | Facility: HOME HEALTH | Age: 88
End: 2023-10-24
Payer: MEDICARE

## 2023-10-24 PROCEDURE — 1090000002 HH PPS REVENUE DEBIT

## 2023-10-24 PROCEDURE — G0155 HHCP-SVS OF CSW,EA 15 MIN: HCPCS | Mod: HHH

## 2023-10-24 PROCEDURE — G0152 HHCP-SERV OF OT,EA 15 MIN: HCPCS | Mod: HHH

## 2023-10-24 PROCEDURE — 1090000001 HH PPS REVENUE CREDIT

## 2023-10-24 PROCEDURE — G0151 HHCP-SERV OF PT,EA 15 MIN: HCPCS | Mod: HHH

## 2023-10-24 SDOH — SOCIAL STABILITY: SOCIAL NETWORK: HELP FROM FAMILY/FRIENDS: DTR

## 2023-10-24 SDOH — HEALTH STABILITY: PHYSICAL HEALTH: EXERCISE TYPE: BLE

## 2023-10-24 ASSESSMENT — ACTIVITIES OF DAILY LIVING (ADL)
DRESSING_UB_CURRENT_FUNCTION: MINIMUM ASSIST
CURRENT_FUNCTION: CONTACT GUARD ASSIST
GROOMING_REQUIRES_ASSISTANCE: 1
TOILETING_REQUIRES_ASSISTANCE: 1
TOILETING: CONTACT GUARD ASSIST
BATHING_REQUIRES_ASSISTANCE: 1
CURRENT_FUNCTION: STAND BY ASSIST
LAUNDRY_REQUIRES_ASSISTANCE: 1
TOILETING: 1
PREPARING MEALS: DEPENDENT
DRESSING_REQUIRES_ASSISTANCE: 1
BATHING ASSESSED: 1
AMBULATION ASSISTANCE: STAND BY ASSIST
DRESSING_LB_CURRENT_FUNCTION: MINIMUM ASSIST
BATHING_CURRENT_FUNCTION: MODERATE ASSIST
PHYSICAL TRANSFERS ASSESSED: 1
AMBULATION_DISTANCE/DURATION_TOLERATED: 100'
AMBULATION ASSISTANCE: 1
PHYSICAL TRANSFERS ASSESSED: 1
AMBULATION ASSISTANCE ON FLAT SURFACES: 1

## 2023-10-24 ASSESSMENT — ENCOUNTER SYMPTOMS
DENIES PAIN: 1
PAIN: 1
HIGHEST PAIN SEVERITY IN PAST 24 HOURS: 8/10
PERSON REPORTING PAIN: PATIENT
SUBJECTIVE PAIN PROGRESSION: WAXING AND WANING
PAIN LOCATION - PAIN SEVERITY: 5/10
LOWEST PAIN SEVERITY IN PAST 24 HOURS: 5/10
PAIN LOCATION: RIGHT ARM
PAIN SEVERITY GOAL: 0/10

## 2023-10-25 ENCOUNTER — APPOINTMENT (OUTPATIENT)
Dept: PRIMARY CARE | Facility: CLINIC | Age: 88
End: 2023-10-25
Payer: MEDICARE

## 2023-10-25 PROCEDURE — 1090000002 HH PPS REVENUE DEBIT

## 2023-10-25 PROCEDURE — 1090000001 HH PPS REVENUE CREDIT

## 2023-10-26 ENCOUNTER — APPOINTMENT (OUTPATIENT)
Dept: HOME HEALTH SERVICES | Facility: HOME HEALTH | Age: 88
End: 2023-10-26
Payer: MEDICARE

## 2023-10-26 PROCEDURE — 1090000001 HH PPS REVENUE CREDIT

## 2023-10-26 PROCEDURE — 1090000002 HH PPS REVENUE DEBIT

## 2023-10-27 PROCEDURE — 1090000001 HH PPS REVENUE CREDIT

## 2023-10-27 PROCEDURE — 1090000002 HH PPS REVENUE DEBIT

## 2023-10-28 PROCEDURE — 1090000001 HH PPS REVENUE CREDIT

## 2023-10-28 PROCEDURE — 1090000002 HH PPS REVENUE DEBIT

## 2023-10-29 PROCEDURE — 1090000001 HH PPS REVENUE CREDIT

## 2023-10-29 PROCEDURE — 1090000002 HH PPS REVENUE DEBIT

## 2023-10-30 PROCEDURE — 1090000002 HH PPS REVENUE DEBIT

## 2023-10-30 PROCEDURE — 1090000001 HH PPS REVENUE CREDIT

## 2023-10-31 ENCOUNTER — HOME CARE VISIT (OUTPATIENT)
Dept: HOME HEALTH SERVICES | Facility: HOME HEALTH | Age: 88
End: 2023-10-31
Payer: MEDICARE

## 2023-10-31 PROCEDURE — 1090000002 HH PPS REVENUE DEBIT

## 2023-10-31 PROCEDURE — G0151 HHCP-SERV OF PT,EA 15 MIN: HCPCS | Mod: HHH

## 2023-10-31 PROCEDURE — 1090000001 HH PPS REVENUE CREDIT

## 2023-10-31 SDOH — HEALTH STABILITY: PHYSICAL HEALTH: EXERCISE TYPE: BLE

## 2023-10-31 ASSESSMENT — ENCOUNTER SYMPTOMS
HIGHEST PAIN SEVERITY IN PAST 24 HOURS: 8/10
PAIN LOCATION - PAIN SEVERITY: 6/10
PAIN SEVERITY GOAL: 0/10
PAIN LOCATION: RIGHT SHOULDER
SUBJECTIVE PAIN PROGRESSION: WAXING AND WANING
PAIN: 1
PERSON REPORTING PAIN: PATIENT
LOWEST PAIN SEVERITY IN PAST 24 HOURS: 5/10

## 2023-10-31 ASSESSMENT — ACTIVITIES OF DAILY LIVING (ADL)
AMBULATION ASSISTANCE ON FLAT SURFACES: 1
PHYSICAL TRANSFERS ASSESSED: 1
AMBULATION ASSISTANCE: SUPERVISION
CURRENT_FUNCTION: SUPERVISION
AMBULATION ASSISTANCE: 1
AMBULATION_DISTANCE/DURATION_TOLERATED: 100'
AMBULATION ASSISTANCE: STAND BY ASSIST

## 2023-11-01 ENCOUNTER — HOME CARE VISIT (OUTPATIENT)
Dept: HOME HEALTH SERVICES | Facility: HOME HEALTH | Age: 88
End: 2023-11-01
Payer: MEDICARE

## 2023-11-01 PROCEDURE — 1090000002 HH PPS REVENUE DEBIT

## 2023-11-01 PROCEDURE — 1090000001 HH PPS REVENUE CREDIT

## 2023-11-01 PROCEDURE — G0158 HHC OT ASSISTANT EA 15: HCPCS | Mod: HHH

## 2023-11-01 ASSESSMENT — ENCOUNTER SYMPTOMS
LOWEST PAIN SEVERITY IN PAST 24 HOURS: 6/10
SUBJECTIVE PAIN PROGRESSION: WAXING AND WANING
PAIN: 1
HIGHEST PAIN SEVERITY IN PAST 24 HOURS: 6/10

## 2023-11-02 ENCOUNTER — APPOINTMENT (OUTPATIENT)
Dept: HOME HEALTH SERVICES | Facility: HOME HEALTH | Age: 88
End: 2023-11-02
Payer: MEDICARE

## 2023-11-02 ENCOUNTER — HOME CARE VISIT (OUTPATIENT)
Dept: HOME HEALTH SERVICES | Facility: HOME HEALTH | Age: 88
End: 2023-11-02
Payer: MEDICARE

## 2023-11-02 VITALS
HEART RATE: 60 BPM | SYSTOLIC BLOOD PRESSURE: 132 MMHG | RESPIRATION RATE: 18 BRPM | TEMPERATURE: 97.7 F | DIASTOLIC BLOOD PRESSURE: 68 MMHG | OXYGEN SATURATION: 97 %

## 2023-11-02 PROCEDURE — 1090000001 HH PPS REVENUE CREDIT

## 2023-11-02 PROCEDURE — 1090000002 HH PPS REVENUE DEBIT

## 2023-11-02 PROCEDURE — G0300 HHS/HOSPICE OF LPN EA 15 MIN: HCPCS | Mod: HHH

## 2023-11-02 ASSESSMENT — ENCOUNTER SYMPTOMS
DENIES PAIN: 1
APPETITE LEVEL: FAIR
CHANGE IN APPETITE: DECREASED
PERSON REPORTING PAIN: PATIENT

## 2023-11-02 ASSESSMENT — PAIN SCALES - PAIN ASSESSMENT IN ADVANCED DEMENTIA (PAINAD): BREATHING: 0

## 2023-11-03 ENCOUNTER — HOME CARE VISIT (OUTPATIENT)
Dept: HOME HEALTH SERVICES | Facility: HOME HEALTH | Age: 88
End: 2023-11-03
Payer: MEDICARE

## 2023-11-03 VITALS — OXYGEN SATURATION: 97 % | HEART RATE: 69 BPM

## 2023-11-03 PROCEDURE — 1090000001 HH PPS REVENUE CREDIT

## 2023-11-03 PROCEDURE — 1090000002 HH PPS REVENUE DEBIT

## 2023-11-03 PROCEDURE — G0158 HHC OT ASSISTANT EA 15: HCPCS | Mod: HHH

## 2023-11-03 ASSESSMENT — ENCOUNTER SYMPTOMS
PAIN LOCATION: RIGHT SHOULDER
LOWEST PAIN SEVERITY IN PAST 24 HOURS: 6/10
SUBJECTIVE PAIN PROGRESSION: WAXING AND WANING
HIGHEST PAIN SEVERITY IN PAST 24 HOURS: 8/10

## 2023-11-03 ASSESSMENT — ACTIVITIES OF DAILY LIVING (ADL)
GROOMING_CURRENT_FUNCTION: MINIMUM ASSIST
PHYSICAL TRANSFERS ASSESSED: 1
DRESSING_LB_CURRENT_FUNCTION: MODERATE ASSIST
GROOMING ASSESSED: 1
CURRENT_FUNCTION: SUPERVISION

## 2023-11-04 PROCEDURE — 1090000002 HH PPS REVENUE DEBIT

## 2023-11-04 PROCEDURE — 1090000001 HH PPS REVENUE CREDIT

## 2023-11-05 PROCEDURE — 1090000002 HH PPS REVENUE DEBIT

## 2023-11-05 PROCEDURE — 1090000001 HH PPS REVENUE CREDIT

## 2023-11-06 ENCOUNTER — HOME CARE VISIT (OUTPATIENT)
Dept: HOME HEALTH SERVICES | Facility: HOME HEALTH | Age: 88
End: 2023-11-06
Payer: MEDICARE

## 2023-11-06 PROCEDURE — G0151 HHCP-SERV OF PT,EA 15 MIN: HCPCS | Mod: HHH

## 2023-11-06 PROCEDURE — 1090000002 HH PPS REVENUE DEBIT

## 2023-11-06 PROCEDURE — 1090000001 HH PPS REVENUE CREDIT

## 2023-11-06 ASSESSMENT — ACTIVITIES OF DAILY LIVING (ADL)
AMBULATION ASSISTANCE: 1
CURRENT_FUNCTION: INDEPENDENT
PHYSICAL TRANSFERS ASSESSED: 1
AMBULATION ASSISTANCE: INDEPENDENT

## 2023-11-06 ASSESSMENT — ENCOUNTER SYMPTOMS
PAIN SEVERITY GOAL: 0/10
PERSON REPORTING PAIN: PATIENT
LOWEST PAIN SEVERITY IN PAST 24 HOURS: 2/10
SUBJECTIVE PAIN PROGRESSION: GRADUALLY IMPROVING
HIGHEST PAIN SEVERITY IN PAST 24 HOURS: 5/10
PAIN: 1
PAIN LOCATION - PAIN SEVERITY: 5/10

## 2023-11-07 ENCOUNTER — HOME CARE VISIT (OUTPATIENT)
Dept: HOME HEALTH SERVICES | Facility: HOME HEALTH | Age: 88
End: 2023-11-07
Payer: MEDICARE

## 2023-11-07 PROCEDURE — G0180 MD CERTIFICATION HHA PATIENT: HCPCS | Performed by: FAMILY MEDICINE

## 2023-11-07 PROCEDURE — 1090000002 HH PPS REVENUE DEBIT

## 2023-11-07 PROCEDURE — G0158 HHC OT ASSISTANT EA 15: HCPCS | Mod: HHH

## 2023-11-07 PROCEDURE — 1090000001 HH PPS REVENUE CREDIT

## 2023-11-07 SDOH — HEALTH STABILITY: PHYSICAL HEALTH: EXERCISE TYPE: BLE

## 2023-11-07 ASSESSMENT — ACTIVITIES OF DAILY LIVING (ADL)
AMBULATION_DISTANCE/DURATION_TOLERATED: 150'
AMBULATION ASSISTANCE ON FLAT SURFACES: 1

## 2023-11-07 ASSESSMENT — ENCOUNTER SYMPTOMS
HIGHEST PAIN SEVERITY IN PAST 24 HOURS: 5/10
SUBJECTIVE PAIN PROGRESSION: WAXING AND WANING
LOWEST PAIN SEVERITY IN PAST 24 HOURS: 5/10
PAIN LOCATION: RIGHT SHOULDER

## 2023-11-08 PROCEDURE — 1090000002 HH PPS REVENUE DEBIT

## 2023-11-08 PROCEDURE — 1090000001 HH PPS REVENUE CREDIT

## 2023-11-08 NOTE — TELEPHONE ENCOUNTER
Pt. Called back and stated she talked to the [facility] RN and her INR was 3.0 on 10/22/23.  This is in her therapeutic range of 2-3 and I have told her to continue her dosing schedule as she was sent out on 9/22/23 in [facility].  I have provided her with fax number of [phone] to have this result faxed, so that we may scan to chart.  She states she will try to come to clinic at the end of November.  Kiera Lira RN

## 2023-11-08 NOTE — TELEPHONE ENCOUNTER
"I called patient and she states she cannot leave the house due to her recent fall.  She states she had a nurse from \" [facility]\"  service come and draw her blood last month and they were supposed to call result in to coumadin clinic.  I do not see any record of labs in her chart.  I have explained to patient that it is dangerous to go unmonitored on coumadin and she could be at risk of bleeding, clotting and or death.  She verbalized understanding, but states she will not be able to leave house at time.  Kiera Lira RN  "

## 2023-11-09 ENCOUNTER — HOME CARE VISIT (OUTPATIENT)
Dept: HOME HEALTH SERVICES | Facility: HOME HEALTH | Age: 88
End: 2023-11-09
Payer: MEDICARE

## 2023-11-09 VITALS — HEART RATE: 70 BPM | OXYGEN SATURATION: 99 %

## 2023-11-09 PROCEDURE — 1090000002 HH PPS REVENUE DEBIT

## 2023-11-09 PROCEDURE — 1090000001 HH PPS REVENUE CREDIT

## 2023-11-09 PROCEDURE — G0158 HHC OT ASSISTANT EA 15: HCPCS | Mod: HHH

## 2023-11-09 ASSESSMENT — ENCOUNTER SYMPTOMS
SUBJECTIVE PAIN PROGRESSION: WAXING AND WANING
PAIN LOCATION: RIGHT SHOULDER
HIGHEST PAIN SEVERITY IN PAST 24 HOURS: 5/10
PERSON REPORTING PAIN: PATIENT
LOWEST PAIN SEVERITY IN PAST 24 HOURS: 0/10
PAIN: 1

## 2023-11-10 PROCEDURE — 1090000001 HH PPS REVENUE CREDIT

## 2023-11-10 PROCEDURE — 1090000002 HH PPS REVENUE DEBIT

## 2023-11-11 ENCOUNTER — APPOINTMENT (OUTPATIENT)
Dept: HOME HEALTH SERVICES | Facility: HOME HEALTH | Age: 88
End: 2023-11-11
Payer: MEDICARE

## 2023-11-11 PROCEDURE — 1090000001 HH PPS REVENUE CREDIT

## 2023-11-11 PROCEDURE — 1090000002 HH PPS REVENUE DEBIT

## 2023-11-12 PROCEDURE — 1090000001 HH PPS REVENUE CREDIT

## 2023-11-12 PROCEDURE — 1090000002 HH PPS REVENUE DEBIT

## 2023-11-13 PROCEDURE — 1090000002 HH PPS REVENUE DEBIT

## 2023-11-13 PROCEDURE — 1090000001 HH PPS REVENUE CREDIT

## 2023-11-14 ENCOUNTER — HOME CARE VISIT (OUTPATIENT)
Dept: HOME HEALTH SERVICES | Facility: HOME HEALTH | Age: 88
End: 2023-11-14
Payer: MEDICARE

## 2023-11-14 PROCEDURE — 1090000002 HH PPS REVENUE DEBIT

## 2023-11-14 PROCEDURE — 1090000001 HH PPS REVENUE CREDIT

## 2023-11-14 PROCEDURE — G0158 HHC OT ASSISTANT EA 15: HCPCS | Mod: HHH

## 2023-11-14 ASSESSMENT — ENCOUNTER SYMPTOMS
LOWEST PAIN SEVERITY IN PAST 24 HOURS: 2/10
HIGHEST PAIN SEVERITY IN PAST 24 HOURS: 3/10
PAIN: 1
PERSON REPORTING PAIN: PATIENT
SUBJECTIVE PAIN PROGRESSION: WAXING AND WANING
PAIN LOCATION: RIGHT HAND

## 2023-11-15 ENCOUNTER — ANTICOAGULATION - WARFARIN VISIT (OUTPATIENT)
Dept: CARDIOLOGY | Facility: CLINIC | Age: 88
End: 2023-11-15
Payer: MEDICARE

## 2023-11-15 DIAGNOSIS — I48.91 ATRIAL FIBRILLATION, UNSPECIFIED TYPE (MULTI): Primary | ICD-10-CM

## 2023-11-15 PROCEDURE — 1090000001 HH PPS REVENUE CREDIT

## 2023-11-15 PROCEDURE — 1090000002 HH PPS REVENUE DEBIT

## 2023-11-16 ENCOUNTER — HOME CARE VISIT (OUTPATIENT)
Dept: HOME HEALTH SERVICES | Facility: HOME HEALTH | Age: 88
End: 2023-11-16
Payer: MEDICARE

## 2023-11-16 PROCEDURE — 1090000002 HH PPS REVENUE DEBIT

## 2023-11-16 PROCEDURE — 1090000001 HH PPS REVENUE CREDIT

## 2023-11-16 PROCEDURE — G0152 HHCP-SERV OF OT,EA 15 MIN: HCPCS | Mod: HHH

## 2023-11-16 ASSESSMENT — ACTIVITIES OF DAILY LIVING (ADL)
OASIS_M1830: 02
HOME_HEALTH_OASIS: 01

## 2023-11-16 ASSESSMENT — ENCOUNTER SYMPTOMS: DENIES PAIN: 1

## 2023-11-21 ENCOUNTER — APPOINTMENT (OUTPATIENT)
Dept: PRIMARY CARE | Facility: CLINIC | Age: 88
End: 2023-11-21
Payer: MEDICARE

## 2023-11-24 ENCOUNTER — ANTICOAGULATION - WARFARIN VISIT (OUTPATIENT)
Dept: CARDIOLOGY | Facility: CLINIC | Age: 88
End: 2023-11-24
Payer: MEDICARE

## 2023-11-24 DIAGNOSIS — I48.91 ATRIAL FIBRILLATION, UNSPECIFIED TYPE (MULTI): Primary | ICD-10-CM

## 2023-11-24 LAB
POC INR: 3.5 (ref 2–3)
POC PROTHROMBIN TIME: ABNORMAL

## 2023-11-24 PROCEDURE — 85610 PROTHROMBIN TIME: CPT | Mod: QW | Performed by: FAMILY MEDICINE

## 2023-11-24 PROCEDURE — 99211 OFF/OP EST MAY X REQ PHY/QHP: CPT | Performed by: FAMILY MEDICINE

## 2023-11-24 NOTE — PROGRESS NOTES
Patient identification verified with 2 identifiers.    Location: [facility] - [address] [place] [id number] [phone]     Referring Physician: Dr. Breanna Herrera  Enrollment/ Re-enrollment date: 4/4/2024   INR Goal: 2.0-3.0  INR monitoring is per [facility] protocol.  Anticoagulation Medication: warfarin  Indication: Atrial Fibrillation/Atrial Flutter    Subjective  Luz Elena , Pt's daughter present with Pt at today's visit.  Bleeding signs/symptoms: No    Bruising: No   Major bleeding event: No  Thrombosis signs/symptoms: No  Thromboembolic event: No  Missed doses: No  Extra doses: No  Medication changes: No  Dietary changes: Yes.  Pt and daughter stated that Vit K intake has been less than normal recently.  Pt encouraged to consume her usual amt. Of greens or INR will run to high.  Change in health: No  Change in activity: No  Alcohol: No  Other concerns: No    Upcoming Surgeries:  Does the Patient Have any upcoming surgeries that require interruption in anticoagulation therapy? no  Does the patient require bridging? no    Dose maintained after last INR was done by [facility] on 10/22/23.  Pt had a fall on 10/16, no surgery, for broken shoulder.  Pt is taking 32.5mg of warfarin weekly.  Anticoagulation Summary  As of 11/24/2023      INR goal:  2.0-3.0   TTR:  15.1 % (1.8 mo)   INR used for dosing:  3.50 (11/24/2023)   Weekly warfarin total:  32.5 mg               Assessment/Plan   Supratherapeutic     1. New dose: no change but will have Pt hold One dose of warfarin on 11/24/23.  32.5mg weekly.    2. Next INR: 1 month.  Can r/s in 4 weeks if INR is therapeutic next visit.  Pt can only get transportation the the week after [place] when her daughter is on break from school.  3. Will need dose reduction at next visit if INR is elevated over 3.0.  4. Pt will consume her usual amt. Of Vit K/Vegetables      Education provided to patient during the visit:  Patient instructed to call in interim with  questions, concerns and changes.   Patient educated on interactions between medications and warfarin.   Patient educated on dietary consistency in vitamin k consumption.   Patient educated on affects of alcohol consumption while taking warfarin.   Patient educated on signs of bleeding/clotting.   Patient educated on compliance with dosing, follow up appointments, and prescribed plan of care.

## 2023-12-05 ENCOUNTER — APPOINTMENT (OUTPATIENT)
Dept: PRIMARY CARE | Facility: CLINIC | Age: 88
End: 2023-12-05
Payer: MEDICARE

## 2023-12-05 ENCOUNTER — OFFICE VISIT (OUTPATIENT)
Dept: PRIMARY CARE | Facility: CLINIC | Age: 88
End: 2023-12-05
Payer: MEDICARE

## 2023-12-05 VITALS — DIASTOLIC BLOOD PRESSURE: 64 MMHG | SYSTOLIC BLOOD PRESSURE: 132 MMHG | BODY MASS INDEX: 25.39 KG/M2 | WEIGHT: 130 LBS

## 2023-12-05 DIAGNOSIS — B02.29 POST HERPETIC NEURALGIA: ICD-10-CM

## 2023-12-05 DIAGNOSIS — E78.5 HYPERLIPIDEMIA, UNSPECIFIED HYPERLIPIDEMIA TYPE: ICD-10-CM

## 2023-12-05 DIAGNOSIS — I10 BENIGN HYPERTENSION: ICD-10-CM

## 2023-12-05 DIAGNOSIS — I48.0 PAROXYSMAL ATRIAL FIBRILLATION (MULTI): ICD-10-CM

## 2023-12-05 PROCEDURE — 1036F TOBACCO NON-USER: CPT | Performed by: FAMILY MEDICINE

## 2023-12-05 PROCEDURE — 1125F AMNT PAIN NOTED PAIN PRSNT: CPT | Performed by: FAMILY MEDICINE

## 2023-12-05 PROCEDURE — 99214 OFFICE O/P EST MOD 30 MIN: CPT | Performed by: FAMILY MEDICINE

## 2023-12-05 PROCEDURE — 1160F RVW MEDS BY RX/DR IN RCRD: CPT | Performed by: FAMILY MEDICINE

## 2023-12-05 PROCEDURE — 1159F MED LIST DOCD IN RCRD: CPT | Performed by: FAMILY MEDICINE

## 2023-12-05 PROCEDURE — 3078F DIAST BP <80 MM HG: CPT | Performed by: FAMILY MEDICINE

## 2023-12-05 PROCEDURE — 3075F SYST BP GE 130 - 139MM HG: CPT | Performed by: FAMILY MEDICINE

## 2023-12-05 RX ORDER — LISINOPRIL 40 MG/1
40 TABLET ORAL DAILY
Qty: 90 TABLET | Refills: 3 | Status: SHIPPED | OUTPATIENT
Start: 2023-12-05

## 2023-12-05 RX ORDER — SIMVASTATIN 20 MG/1
20 TABLET, FILM COATED ORAL NIGHTLY
Qty: 90 TABLET | Refills: 3 | Status: SHIPPED | OUTPATIENT
Start: 2023-12-05

## 2023-12-05 RX ORDER — PREGABALIN 150 MG/1
150 CAPSULE ORAL 2 TIMES DAILY
Qty: 180 CAPSULE | Refills: 0 | Status: SHIPPED | OUTPATIENT
Start: 2023-12-05 | End: 2024-03-05 | Stop reason: SDUPTHER

## 2023-12-05 RX ORDER — WARFARIN SODIUM 5 MG/1
5 TABLET ORAL EVERY EVENING
Qty: 90 TABLET | Refills: 3 | Status: SHIPPED | OUTPATIENT
Start: 2023-12-05

## 2023-12-05 NOTE — PROGRESS NOTES
Chief complaint:   Chief Complaint   Patient presents with    Follow-up     3 month medication management        HPI:  Estee Medina is a 88 y.o. female who presents for evaluation  with daughter Luz Elena for post herpetic neuralgia and a refill of Pregabalin. It seems to be helping minimally. She does get leg swelling which worsens throughout the day and improved by the morning. This has been found to be dose dependent with her Lyrica as different doses have been tried with different effects.     She had a fall and broke her right arm. She is right handed. She has had an aid for 6 weeks. She is following with orthopedics.  This occurred after getting up from a chair in which she fell.    Physical exam:  /64   Wt 59 kg (130 lb)   BMI 25.39 kg/m²   General: NAD, well appearing female  Heart: RRR  Lungs: CTAB, no wheezes, rales, rhonchi  Extremities: non pitting edema bilateral ankles     Assessment/Plan   Problem List Items Addressed This Visit       Paroxysmal atrial fibrillation (CMS/HCC)    Relevant Medications    warfarin (Coumadin) 5 mg tablet    Benign hypertension    Relevant Medications    lisinopril 40 mg tablet    Hyperlipidemia    Relevant Medications    simvastatin (Zocor) 20 mg tablet    Post herpetic neuralgia    Relevant Medications    pregabalin (Lyrica) 150 mg capsule   Medications refilled as above. [place] checked and verified today.   [place] paperwork completed for daughter Luz Elena.    Breanna Herrera DO

## 2023-12-06 ENCOUNTER — TELEPHONE (OUTPATIENT)
Dept: PRIMARY CARE | Facility: CLINIC | Age: 88
End: 2023-12-06
Payer: MEDICARE

## 2023-12-06 DIAGNOSIS — I10 BENIGN HYPERTENSION: ICD-10-CM

## 2023-12-06 RX ORDER — ATENOLOL 50 MG/1
50 TABLET ORAL DAILY
Qty: 90 TABLET | Refills: 3 | Status: SHIPPED | OUTPATIENT
Start: 2023-12-06 | End: 2023-12-06 | Stop reason: SDUPTHER

## 2023-12-06 RX ORDER — ATENOLOL 50 MG/1
50 TABLET ORAL DAILY
Qty: 90 TABLET | Refills: 3 | Status: SHIPPED | OUTPATIENT
Start: 2023-12-06

## 2023-12-06 RX ORDER — ATENOLOL 50 MG/1
TABLET ORAL
Refills: 0 | OUTPATIENT
Start: 2023-12-06

## 2023-12-06 NOTE — TELEPHONE ENCOUNTER
Pt is calling in regards to her Atenolol 50 MG. Pt is using [facility] now and is asking if you can resend her script to [facility]?    Medication:     Atenolol 50 MG; Take 1 tablet once daily. As directed.     Pharmacy: [id number]     LR: 4-25-23    90 tablets with 3 refills    Demyelinating disease

## 2023-12-22 ENCOUNTER — ANTICOAGULATION - WARFARIN VISIT (OUTPATIENT)
Dept: CARDIOLOGY | Facility: CLINIC | Age: 88
End: 2023-12-22
Payer: MEDICARE

## 2023-12-27 ENCOUNTER — ANTICOAGULATION - WARFARIN VISIT (OUTPATIENT)
Dept: CARDIOLOGY | Facility: CLINIC | Age: 88
End: 2023-12-27
Payer: MEDICARE

## 2023-12-27 NOTE — PROGRESS NOTES
Pt's daughter Luz Elena called [facility] clinic and I spoke to her.  She requested to move Pt's appt. From 12/28 to 12/29.  I changed the appt. Per her request to 12/29 at 1600.

## 2023-12-28 ENCOUNTER — APPOINTMENT (OUTPATIENT)
Dept: CARDIOLOGY | Facility: CLINIC | Age: 88
End: 2023-12-28
Payer: MEDICARE

## 2023-12-29 ENCOUNTER — ANTICOAGULATION - WARFARIN VISIT (OUTPATIENT)
Dept: CARDIOLOGY | Facility: CLINIC | Age: 88
End: 2023-12-29
Payer: MEDICARE

## 2023-12-29 DIAGNOSIS — I48.91 ATRIAL FIBRILLATION, UNSPECIFIED TYPE (MULTI): Primary | ICD-10-CM

## 2023-12-29 LAB
POC INR: 3.4 (ref 2–3)
POC PROTHROMBIN TIME: ABNORMAL

## 2023-12-29 PROCEDURE — 85610 PROTHROMBIN TIME: CPT | Mod: QW | Performed by: FAMILY MEDICINE

## 2023-12-29 PROCEDURE — 99211 OFF/OP EST MAY X REQ PHY/QHP: CPT | Mod: 27 | Performed by: FAMILY MEDICINE

## 2023-12-29 NOTE — PROGRESS NOTES
Patient identification verified with 2 identifiers.    Location: [facility] - [address] [place] [id number] [phone]     Referring Physician: Dr. Breanna Herrera  Enrollment/ Re-enrollment date: 4/4/2024   INR Goal: 2.0-3.0  INR monitoring is per [facility] protocol.  Anticoagulation Medication: warfarin  Indication: Atrial Fibrillation/Atrial Flutter    Subjective    Luz Elena , Pt's daughter present with Pt at today's visit.  Bleeding signs/symptoms: No    Bruising: No   Major bleeding event: No  Thrombosis signs/symptoms: No  Thromboembolic event: No  Missed doses: Yes.  Pt held One dose of warfarin as instructed on 11/24/23.  Extra doses: No  Medication changes: No  Dietary changes: No.  Pt and daughter stated that Vit K intake has been less than normal recently.  Pt encouraged to consume her usual amt. Of greens or INR will run to high.  Pt agreed to Increase Vit K intake to 4 days per week from 3.  Pt enjoys eating salad, vegetables and spinach.  Change in health: No  Change in activity: No  Alcohol: Yes.  Pt states she is drinking Red wine again.  She consumes 6-8 oz per night.  Other concerns: No    Upcoming Surgeries:  Does the Patient Have any upcoming surgeries that require interruption in anticoagulation therapy? no  Does the patient require bridging? no    Dose maintained after last visit, INR was 3.5 on 11/24/23 but a One day dose hold was done on 11/24/23.  Pt had a fall on 10/16, no surgery, for broken shoulder.  Pt is taking 32.5mg of warfarin weekly.  Anticoagulation Summary  As of 12/29/2023      INR goal:  2.0-3.0   TTR:  9.1 % (2.9 mo)   INR used for dosing:  3.40 (12/29/2023)   Weekly warfarin total:  32.5 mg               Assessment/Plan   Supratherapeutic     1. New dose: no change but will have Pt hold One dose of warfarin on 12/29/23.  32.5mg weekly.    2. Next INR: 1 week.  Can r/s in 4 weeks if INR is therapeutic next visit.  Pt will RTC in 4 weeks instead of 1  week recommended due to her transportation dependence.  Pt can only get transportation when one of her daughters is available and usually later in the day.  3. Will need dose reduction at next visit if INR is elevated over 3.0.  4. Pt will consume her usual amt. Of Vit K/Vegetables but will add a serving One more day during the week.  So 3 days to 4 days.  This will include lettuce, broccoli and spinach and Pt will continue wine consumption.      Education provided to patient during the visit:  Patient instructed to call in interim with questions, concerns and changes.   Patient educated on interactions between medications and warfarin.   Patient educated on dietary consistency in vitamin k consumption.   Patient educated on affects of alcohol consumption while taking warfarin.   Patient educated on signs of bleeding/clotting.   Patient educated on compliance with dosing, follow up appointments, and prescribed plan of care.

## 2024-01-25 ENCOUNTER — ANTICOAGULATION - WARFARIN VISIT (OUTPATIENT)
Dept: CARDIOLOGY | Facility: CLINIC | Age: 89
End: 2024-01-25
Payer: MEDICARE

## 2024-01-25 DIAGNOSIS — I48.91 ATRIAL FIBRILLATION, UNSPECIFIED TYPE (MULTI): Primary | ICD-10-CM

## 2024-01-25 LAB
POC INR: 2.9 (ref 2–3)
POC PROTHROMBIN TIME: NORMAL

## 2024-01-25 PROCEDURE — 99211 OFF/OP EST MAY X REQ PHY/QHP: CPT | Performed by: FAMILY MEDICINE

## 2024-01-25 PROCEDURE — 85610 PROTHROMBIN TIME: CPT | Mod: QW | Performed by: FAMILY MEDICINE

## 2024-01-25 NOTE — PROGRESS NOTES
Patient identification verified with 2 identifiers.    Location: [facility] [place] - [address] [place] [id number] [phone]     Referring Physician: Dr. Breanna Herrera  Enrollment/ Re-enrollment date: 2024   INR Goal: 2.0-3.0  INR monitoring is per [facility] protocol.  Anticoagulation Medication: warfarin  Indication: Atrial Fibrillation/Atrial Flutter    Subjective    Angélica, Pt's daughter present with Pt at today's visit.  Bleeding signs/symptoms: No    Bruising: No   Major bleeding event: No  Thrombosis signs/symptoms: No  Thromboembolic event: No  Missed doses: No.  Pt held One dose of warfarin as instructed on 23.  Extra doses: No  Medication changes: No  Dietary changes: Yes.  Pt and daughter stated that Vit K intake has been less than normal recently.  Pt encouraged to consume her usual amt. Of greens or INR will run to high.  Pt agreed to Increase Vit K intake to 4 days per week from 3.  Pt enjoys eating salad, vegetables and spinach.  Pt did this and will continue with the same schedule.  Change in health: No  Change in activity: No  Alcohol: Yes.  Pt states she is drinking Red wine again.  She consumes 6-8 oz per night.  Other concerns: No    Upcoming Surgeries:  Does the Patient Have any upcoming surgeries that require interruption in anticoagulation therapy? no  Does the patient require bridging? no    Dose maintained after last visit but a One time dose reduction of 2.5mg was done after last visit on 23 and Pt will Increase Vit K intake.  Pt is taking 32.5mg of warfarin weekly.  Anticoagulation Summary  As of 2024      INR goal:  2.0-3.0   TTR:  11.7 % (3.8 mo)   INR used for dosin.90 (2024)   Weekly warfarin total:  32.5 mg               Assessment/Plan   Therapeutic     1. New dose: no change but will have Pt hold One dose of warfarin on 23.  32.5mg weekly.    2. Next INR: 1 month.  Can r/s in 4 weeks if INR is therapeutic next visit.   3. Pt  will consume her usual amt. Of Vit K/Vegetables but will add a serving One more day during the week.  So 3 days to 4 days.  This will include lettuce, broccoli and spinach and Pt will continue wine consumption.  Pt agreed to continue this regimen.      Education provided to patient during the visit:  Patient instructed to call in interim with questions, concerns and changes.   Patient educated on interactions between medications and warfarin.   Patient educated on dietary consistency in vitamin k consumption.   Patient educated on affects of alcohol consumption while taking warfarin.   Patient educated on signs of bleeding/clotting.   Patient educated on compliance with dosing, follow up appointments, and prescribed plan of care.

## 2024-02-22 ENCOUNTER — ANTICOAGULATION - WARFARIN VISIT (OUTPATIENT)
Dept: CARDIOLOGY | Facility: CLINIC | Age: 89
End: 2024-02-22
Payer: MEDICARE

## 2024-02-22 DIAGNOSIS — I48.91 ATRIAL FIBRILLATION, UNSPECIFIED TYPE (MULTI): Primary | ICD-10-CM

## 2024-02-23 ENCOUNTER — APPOINTMENT (OUTPATIENT)
Dept: CARDIOLOGY | Facility: CLINIC | Age: 89
End: 2024-02-23
Payer: MEDICARE

## 2024-02-26 ENCOUNTER — ANTICOAGULATION - WARFARIN VISIT (OUTPATIENT)
Dept: CARDIOLOGY | Facility: CLINIC | Age: 89
End: 2024-02-26
Payer: MEDICARE

## 2024-02-26 DIAGNOSIS — I48.91 ATRIAL FIBRILLATION, UNSPECIFIED TYPE (MULTI): Primary | ICD-10-CM

## 2024-02-26 LAB
POC INR: 2.2
POC PROTHROMBIN TIME: NORMAL

## 2024-02-26 PROCEDURE — 99211 OFF/OP EST MAY X REQ PHY/QHP: CPT

## 2024-02-26 PROCEDURE — 85610 PROTHROMBIN TIME: CPT | Mod: QW

## 2024-02-26 NOTE — PROGRESS NOTES
Patient identification verified with 2 identifiers.    Location: [facility] - [address] [place] [id number] [phone]      Referring Physician: Dr. Breanna Herrera  Enrollment/ Re-enrollment date: 2024   INR Goal: 2.0-3.0  INR monitoring is per [facility] protocol.  Anticoagulation Medication: warfarin  Indication: Atrial Fibrillation/Atrial Flutter       Subjective   Bleeding signs/symptoms: No    Bruising: No   Major bleeding event: No  Thrombosis signs/symptoms: No  Thromboembolic event: No  Missed doses: No  Extra doses: No  Medication changes: No  Dietary changes: No  Change in health: No  Change in activity: No  Alcohol: No  Other concerns: No    Upcoming Procedures:  Does the Patient Have any upcoming procedures that require interruption in anticoagulation therapy? no  Does the patient require bridging? no      Anticoagulation Summary  As of 2024      INR goal:  2.0-3.0   TTR:  30.9 % (4.9 mo)   INR used for dosin.20 (2024)   Weekly warfarin total:  32.5 mg               Assessment/Plan   Therapeutic     1. New dose: no change    2. Next INR: 1 month      Education provided to patient during the visit:  Patient instructed to call in interim with questions, concerns and changes.   Patient educated on interactions between medications and warfarin.   Patient educated on dietary consistency in vitamin k consumption.   Patient educated on affects of alcohol consumption while taking warfarin.   Patient educated on signs of bleeding/clotting.   Patient educated on compliance with dosing, follow up appointments, and prescribed plan of care.

## 2024-03-04 PROBLEM — R35.0 INCREASED FREQUENCY OF URINATION: Status: RESOLVED | Noted: 2024-03-04 | Resolved: 2024-03-04

## 2024-03-04 PROBLEM — R60.0 LOCALIZED EDEMA: Status: RESOLVED | Noted: 2024-03-04 | Resolved: 2024-03-04

## 2024-03-04 PROBLEM — H26.9 CATARACT: Status: RESOLVED | Noted: 2024-03-04 | Resolved: 2024-03-04

## 2024-03-04 PROBLEM — H92.09 PAIN OF EAR STRUCTURE: Status: RESOLVED | Noted: 2024-03-04 | Resolved: 2024-03-04

## 2024-03-04 PROBLEM — R21 RASH: Status: RESOLVED | Noted: 2024-03-04 | Resolved: 2024-03-04

## 2024-03-04 PROBLEM — R52 PAIN: Status: RESOLVED | Noted: 2024-03-04 | Resolved: 2024-03-04

## 2024-03-04 PROBLEM — R04.0 EPISTAXIS: Status: RESOLVED | Noted: 2024-03-04 | Resolved: 2024-03-04

## 2024-03-04 PROBLEM — G61.9 INFLAMMATORY NEUROPATHY (MULTI): Status: RESOLVED | Noted: 2024-03-04 | Resolved: 2024-03-04

## 2024-03-05 ENCOUNTER — OFFICE VISIT (OUTPATIENT)
Dept: PRIMARY CARE | Facility: CLINIC | Age: 89
End: 2024-03-05
Payer: MEDICARE

## 2024-03-05 VITALS — DIASTOLIC BLOOD PRESSURE: 76 MMHG | TEMPERATURE: 97.2 F | SYSTOLIC BLOOD PRESSURE: 140 MMHG

## 2024-03-05 DIAGNOSIS — B02.29 POST HERPETIC NEURALGIA: ICD-10-CM

## 2024-03-05 DIAGNOSIS — Z51.81 THERAPEUTIC DRUG MONITORING: Primary | ICD-10-CM

## 2024-03-05 DIAGNOSIS — I10 BENIGN HYPERTENSION: ICD-10-CM

## 2024-03-05 LAB
AMPHETAMINES UR QL SCN: NORMAL
BARBITURATES UR QL SCN: NORMAL
BENZODIAZ UR QL SCN: NORMAL
BZE UR QL SCN: NORMAL
CANNABINOIDS UR QL SCN: NORMAL
FENTANYL+NORFENTANYL UR QL SCN: NORMAL
METHADONE UR QL SCN: NORMAL
OPIATES UR QL SCN: NORMAL
OXYCODONE+OXYMORPHONE UR QL SCN: NORMAL
PCP UR QL SCN: NORMAL

## 2024-03-05 PROCEDURE — 80366 DRUG SCREENING PREGABALIN: CPT

## 2024-03-05 PROCEDURE — 80307 DRUG TEST PRSMV CHEM ANLYZR: CPT

## 2024-03-05 PROCEDURE — 1036F TOBACCO NON-USER: CPT | Performed by: FAMILY MEDICINE

## 2024-03-05 PROCEDURE — 3077F SYST BP >= 140 MM HG: CPT | Performed by: FAMILY MEDICINE

## 2024-03-05 PROCEDURE — 1125F AMNT PAIN NOTED PAIN PRSNT: CPT | Performed by: FAMILY MEDICINE

## 2024-03-05 PROCEDURE — 3078F DIAST BP <80 MM HG: CPT | Performed by: FAMILY MEDICINE

## 2024-03-05 PROCEDURE — 1159F MED LIST DOCD IN RCRD: CPT | Performed by: FAMILY MEDICINE

## 2024-03-05 PROCEDURE — 99213 OFFICE O/P EST LOW 20 MIN: CPT | Performed by: FAMILY MEDICINE

## 2024-03-05 RX ORDER — PREGABALIN 150 MG/1
150 CAPSULE ORAL 2 TIMES DAILY
Qty: 180 CAPSULE | Refills: 0 | Status: SHIPPED | OUTPATIENT
Start: 2024-03-05 | End: 2024-09-01

## 2024-03-05 NOTE — ASSESSMENT & PLAN NOTE
3/5/2024  - continue 150 mg PO BID Lyrica, 90 day supply provided  - [place] checked and verified today  - controlled substance agreement in chart  - last urine drug screen collected today 3/5/2024

## 2024-03-05 NOTE — PROGRESS NOTES
Chief complaint:   Chief Complaint   Patient presents with    Med Management       HPI:  Estee Medina is a 88 y.o. female who presents for evaluation of post herpetic neuralgia. She states she is adequately controlled with her current dosing of Lyrica.     She has noted intermittent BP readings 140-150 systolic (never 90 or higher diastolic) though usually 120-130s systolic     Physical exam:  /76 (BP Location: Left arm, Patient Position: Sitting)   Temp 36.2 °C (97.2 °F) (Oral)   General: NAD, well appearing female  Heart: RRR, no murmur appreciated today  Lungs: CTAB, no wheezes, rales, rhonchi  Abdomen: soft, non tender, normoactive BS, no organomegaly    Assessment/Plan   Problem List Items Addressed This Visit       Benign hypertension     3/5/2024  - continue home monitoring  - continue current medication therapy         Post herpetic neuralgia     3/5/2024  - continue 150 mg PO BID Lyrica, 90 day supply provided  - [place] checked and verified today  - controlled substance agreement in chart  - last urine drug screen collected today 3/5/2024         Relevant Medications    pregabalin (Lyrica) 150 mg capsule     Other Visit Diagnoses       Therapeutic drug monitoring    -  Primary    Relevant Orders    Drug Screen, Urine With Reflex to Confirmation    Pregabalin,Urine            Breanna Herrera DO

## 2024-03-11 LAB — PREGABALIN UR CFM-MCNC: 327.3 UG/ML

## 2024-03-18 ENCOUNTER — ANTICOAGULATION - WARFARIN VISIT (OUTPATIENT)
Dept: CARDIOLOGY | Facility: CLINIC | Age: 89
End: 2024-03-18
Payer: MEDICARE

## 2024-03-18 DIAGNOSIS — I48.91 ATRIAL FIBRILLATION, UNSPECIFIED TYPE (MULTI): Primary | ICD-10-CM

## 2024-03-22 ENCOUNTER — APPOINTMENT (OUTPATIENT)
Dept: CARDIOLOGY | Facility: CLINIC | Age: 89
End: 2024-03-22
Payer: MEDICARE

## 2024-03-26 ENCOUNTER — ANTICOAGULATION - WARFARIN VISIT (OUTPATIENT)
Dept: CARDIOLOGY | Facility: CLINIC | Age: 89
End: 2024-03-26
Payer: MEDICARE

## 2024-03-26 DIAGNOSIS — I48.91 ATRIAL FIBRILLATION, UNSPECIFIED TYPE (MULTI): Primary | ICD-10-CM

## 2024-03-26 LAB
POC INR: 3
POC PROTHROMBIN TIME: NORMAL

## 2024-03-26 PROCEDURE — 99211 OFF/OP EST MAY X REQ PHY/QHP: CPT

## 2024-03-26 PROCEDURE — 85610 PROTHROMBIN TIME: CPT | Mod: QW

## 2024-03-26 NOTE — PROGRESS NOTES
Patient identification verified with 2 identifiers.    Location: [facility] - [address] [place] [id number] [phone]      Referring Physician: Dr. Breanna Herrera  Enrollment/ Re-enrollment date: 4/4/2024   INR Goal: 2.0-3.0  INR monitoring is per [facility] protocol.  Anticoagulation Medication: warfarin  Indication: Atrial Fibrillation/Atrial Flutter       Subjective   Bleeding signs/symptoms: No    Bruising: No   Major bleeding event: No  Thrombosis signs/symptoms: No  Thromboembolic event: No  Missed doses: No  Extra doses: No  Medication changes: No  Dietary changes: No  Pt has been consistent with her vit k intake  Change in health: No  Change in activity: No  Alcohol: No  Other concerns: No    Upcoming Procedures:  Does the Patient Have any upcoming procedures that require interruption in anticoagulation therapy? no  Does the patient require bridging? no      Anticoagulation Summary  As of 3/26/2024      INR goal:  2.0-3.0   TTR:  42.5 % (5.9 mo)   INR used for dosing:  3.00 (3/26/2024)   Weekly warfarin total:  32.5 mg               Assessment/Plan   Therapeutic     1. New dose: no change    2. Next INR: 1 month      Education provided to patient during the visit:  Patient instructed to call in interim with questions, concerns and changes.   Patient educated on interactions between medications and warfarin.   Patient educated on dietary consistency in vitamin k consumption.   Patient educated on affects of alcohol consumption while taking warfarin.   Patient educated on signs of bleeding/clotting.   Patient educated on compliance with dosing, follow up appointments, and prescribed plan of care.

## 2024-03-26 NOTE — PROGRESS NOTES
Patient identification verified with 2 identifiers.    Location: [facility] - [address] [place] [id number] [phone]      Referring Physician: Dr. Breanna Herrera  Enrollment/ Re-enrollment date: 4/4/2024   INR Goal: 2.0-3.0  INR monitoring is per [facility] protocol.  Anticoagulation Medication: warfarin  Indication: Atrial Fibrillation/Atrial Flutter       Subjective   Bleeding signs/symptoms:      Bruising:     Major bleeding event:    Thrombosis signs/symptoms:    Thromboembolic event:    Missed doses:    Extra doses:    Medication changes:    Dietary changes:    Change in health:    Change in activity:    Alcohol:    Other concerns:      Upcoming Procedures:  Does the Patient Have any upcoming procedures that require interruption in anticoagulation therapy? no  Does the patient require bridging? no    Dose maintained after last visit.  Pt is taking 32.5mg of warfarin weekly.  Anticoagulation Summary  As of 3/26/2024      INR goal:  2.0-3.0   TTR:  30.9 % (4.9 mo)   INR used for dosing:                 Assessment/Plan   Therapeutic     1. New dose: no change    2. Next INR: 1 month      Education provided to patient during the visit:  Patient instructed to call in interim with questions, concerns and changes.   Patient educated on interactions between medications and warfarin.   Patient educated on dietary consistency in vitamin k consumption.   Patient educated on affects of alcohol consumption while taking warfarin.   Patient educated on signs of bleeding/clotting.   Patient educated on compliance with dosing, follow up appointments, and prescribed plan of care.

## 2024-04-26 ENCOUNTER — ANTICOAGULATION - WARFARIN VISIT (OUTPATIENT)
Dept: CARDIOLOGY | Facility: CLINIC | Age: 89
End: 2024-04-26
Payer: MEDICARE

## 2024-04-26 DIAGNOSIS — I48.91 ATRIAL FIBRILLATION, UNSPECIFIED TYPE (MULTI): Primary | ICD-10-CM

## 2024-04-26 LAB
POC INR: 2.6
POC PROTHROMBIN TIME: NORMAL

## 2024-04-26 PROCEDURE — 99211 OFF/OP EST MAY X REQ PHY/QHP: CPT

## 2024-04-26 PROCEDURE — 85610 PROTHROMBIN TIME: CPT | Mod: QW

## 2024-04-26 NOTE — PROGRESS NOTES
Patient identification verified with 2 identifiers.    Location: [facility] - [address] [place] [id number] [phone]      Referring Physician: Dr. Breanna Herrera  Enrollment/ Re-enrollment date: 4/4/2024   INR Goal: 2.0-3.0  INR monitoring is per [facility] protocol.  Anticoagulation Medication: warfarin  Indication: Atrial Fibrillation/Atrial Flutter       Subjective   Bleeding signs/symptoms: No    Bruising: No   Major bleeding event: No  Thrombosis signs/symptoms: No  Thromboembolic event: No  Missed doses: No  Extra doses: No  Medication changes: No  Dietary changes: No  Change in health: No  Change in activity: No  Alcohol: No  Other concerns: No    Upcoming Procedures:  Does the Patient Have any upcoming procedures that require interruption in anticoagulation therapy? no  Does the patient require bridging? no      Anticoagulation Summary  As of 4/26/2024      INR goal:  2.0-3.0   TTR:  42.5% (5.9 mo)   INR used for dosing:     Weekly warfarin total:  32.5 mg               Assessment/Plan   Therapeutic     1. New dose: no change    2. Next INR: 1 month      Education provided to patient during the visit:  Patient instructed to call in interim with questions, concerns and changes.   Patient educated on interactions between medications and warfarin.   Patient educated on dietary consistency in vitamin k consumption.   Patient educated on affects of alcohol consumption while taking warfarin.   Patient educated on signs of bleeding/clotting.   Patient educated on compliance with dosing, follow up appointments, and prescribed plan of care.

## 2024-05-24 ENCOUNTER — ANTICOAGULATION - WARFARIN VISIT (OUTPATIENT)
Dept: CARDIOLOGY | Facility: CLINIC | Age: 89
End: 2024-05-24
Payer: MEDICARE

## 2024-05-24 DIAGNOSIS — I48.91 ATRIAL FIBRILLATION, UNSPECIFIED TYPE (MULTI): Primary | ICD-10-CM

## 2024-05-24 LAB
POC INR: 2.1
POC PROTHROMBIN TIME: NORMAL

## 2024-05-24 PROCEDURE — 99211 OFF/OP EST MAY X REQ PHY/QHP: CPT

## 2024-05-24 PROCEDURE — 85610 PROTHROMBIN TIME: CPT | Mod: QW

## 2024-05-24 NOTE — PROGRESS NOTES
Patient identification verified with 2 identifiers.    Location: [facility] - [address] [place] [id number] [phone]      Referring Physician: Dr. Breanna Herrera  Enrollment/ Re-enrollment date: 2024 SENT TO [facility] ON 24  INR Goal: 2.0-3.0  INR monitoring is per [facility] protocol.  Anticoagulation Medication: warfarin  Indication: Atrial Fibrillation/Atrial Flutter       Subjective   Bleeding signs/symptoms: No    Bruising: No   Major bleeding event: No  Thrombosis signs/symptoms: No  Thromboembolic event: No  Missed doses: No  Extra doses: No  Medication changes: No  Dietary changes: No  Change in health: No  Change in activity: No  Alcohol: No  Other concerns: No    Upcoming Procedures:  Does the Patient Have any upcoming procedures that require interruption in anticoagulation therapy? no  Does the patient require bridging? no      Anticoagulation Summary  As of 2024      INR goal:  2.0-3.0   TTR:  56.9% (7.8 mo)   INR used for dosin.10 (2024)   Weekly warfarin total:  32.5 mg               Assessment/Plan   Therapeutic     1. New dose: no change    2. Next INR: 1 month      Education provided to patient during the visit:  Patient instructed to call in interim with questions, concerns and changes.   Patient educated on compliance with dosing, follow up appointments, and prescribed plan of care.

## 2024-06-18 ENCOUNTER — APPOINTMENT (OUTPATIENT)
Dept: PRIMARY CARE | Facility: CLINIC | Age: 89
End: 2024-06-18
Payer: MEDICARE

## 2024-06-18 VITALS
HEIGHT: 58 IN | WEIGHT: 128 LBS | BODY MASS INDEX: 26.87 KG/M2 | TEMPERATURE: 98.5 F | DIASTOLIC BLOOD PRESSURE: 72 MMHG | SYSTOLIC BLOOD PRESSURE: 168 MMHG

## 2024-06-18 DIAGNOSIS — Z00.00 ROUTINE GENERAL MEDICAL EXAMINATION AT HEALTH CARE FACILITY: Primary | ICD-10-CM

## 2024-06-18 DIAGNOSIS — B02.29 POST HERPETIC NEURALGIA: ICD-10-CM

## 2024-06-18 DIAGNOSIS — I10 BENIGN HYPERTENSION: ICD-10-CM

## 2024-06-18 DIAGNOSIS — R26.89 BALANCE PROBLEM: ICD-10-CM

## 2024-06-18 PROCEDURE — G0439 PPPS, SUBSEQ VISIT: HCPCS | Performed by: FAMILY MEDICINE

## 2024-06-18 PROCEDURE — 1036F TOBACCO NON-USER: CPT | Performed by: FAMILY MEDICINE

## 2024-06-18 PROCEDURE — 3078F DIAST BP <80 MM HG: CPT | Performed by: FAMILY MEDICINE

## 2024-06-18 PROCEDURE — 1160F RVW MEDS BY RX/DR IN RCRD: CPT | Performed by: FAMILY MEDICINE

## 2024-06-18 PROCEDURE — 1159F MED LIST DOCD IN RCRD: CPT | Performed by: FAMILY MEDICINE

## 2024-06-18 PROCEDURE — 1170F FXNL STATUS ASSESSED: CPT | Performed by: FAMILY MEDICINE

## 2024-06-18 PROCEDURE — 1123F ACP DISCUSS/DSCN MKR DOCD: CPT | Performed by: FAMILY MEDICINE

## 2024-06-18 PROCEDURE — 99213 OFFICE O/P EST LOW 20 MIN: CPT | Performed by: FAMILY MEDICINE

## 2024-06-18 PROCEDURE — 3077F SYST BP >= 140 MM HG: CPT | Performed by: FAMILY MEDICINE

## 2024-06-18 RX ORDER — ATENOLOL 50 MG/1
50 TABLET ORAL DAILY
Qty: 90 TABLET | Refills: 3 | Status: SHIPPED | OUTPATIENT
Start: 2024-06-18

## 2024-06-18 RX ORDER — PREGABALIN 150 MG/1
150 CAPSULE ORAL 2 TIMES DAILY
Qty: 180 CAPSULE | Refills: 0 | Status: SHIPPED | OUTPATIENT
Start: 2024-06-18 | End: 2024-12-15

## 2024-06-18 RX ORDER — LISINOPRIL 40 MG/1
40 TABLET ORAL DAILY
Qty: 90 TABLET | Refills: 3 | Status: SHIPPED | OUTPATIENT
Start: 2024-06-18

## 2024-06-18 ASSESSMENT — ACTIVITIES OF DAILY LIVING (ADL)
DRESSING: INDEPENDENT
GROCERY_SHOPPING: NEEDS ASSISTANCE
BATHING: NEEDS ASSISTANCE
DOING_HOUSEWORK: INDEPENDENT
MANAGING_FINANCES: INDEPENDENT
BATHING: INDEPENDENT
TAKING_MEDICATION: INDEPENDENT

## 2024-06-18 ASSESSMENT — ENCOUNTER SYMPTOMS
PALPITATIONS: 0
SHORTNESS OF BREATH: 0
EYE PAIN: 0
ABDOMINAL PAIN: 0
RHINORRHEA: 1
FEVER: 0
DIARRHEA: 0
CHILLS: 0
LOSS OF SENSATION IN FEET: 0
DEPRESSION: 0
COUGH: 0
CONSTIPATION: 0
OCCASIONAL FEELINGS OF UNSTEADINESS: 0

## 2024-06-18 ASSESSMENT — PATIENT HEALTH QUESTIONNAIRE - PHQ9
1. LITTLE INTEREST OR PLEASURE IN DOING THINGS: NOT AT ALL
2. FEELING DOWN, DEPRESSED OR HOPELESS: NOT AT ALL
SUM OF ALL RESPONSES TO PHQ9 QUESTIONS 1 AND 2: 0

## 2024-06-18 NOTE — PROGRESS NOTES
"Subjective   Reason for Visit: Estee Medina is an 89 y.o. female here for a Medicare Wellness visit.     Past Medical, Surgical, and Family History reviewed and updated in chart.    Reviewed all medications by prescribing practitioner or clinical pharmacist (such as prescriptions, OTCs, herbal therapies and supplements) and documented in the medical record.    HPI    She notes swelling of both eye lids today. She has used a new face cream    She follows with eye doctor regularly  She needs a refill today for her medications    Patient Care Team:  Breanna Herrera DO as PCP - General  Breanna Herrera DO as PCP - O Medicare Advantage PCP     Review of Systems   Constitutional:  Negative for chills and fever.   HENT:  Positive for rhinorrhea. Negative for hearing loss.    Eyes:  Negative for pain and visual disturbance.   Respiratory:  Negative for cough and shortness of breath.    Cardiovascular:  Negative for chest pain and palpitations.   Gastrointestinal:  Negative for abdominal pain, constipation and diarrhea.       Objective   Vitals:  /72 (BP Location: Right arm, Patient Position: Sitting)   Temp 36.9 °C (98.5 °F)   Ht 1.473 m (4' 10\")   Wt 58.1 kg (128 lb)   BMI 26.75 kg/m²       Physical Exam  Constitutional:       Appearance: Normal appearance.   HENT:      Head: Normocephalic and atraumatic.      Nose: Nose normal.      Mouth/Throat:      Mouth: Mucous membranes are moist.   Eyes:      Comments: Mild eyelid edema upper and lower   Cardiovascular:      Rate and Rhythm: Normal rate and regular rhythm.   Pulmonary:      Effort: Pulmonary effort is normal.      Breath sounds: Normal breath sounds.   Abdominal:      General: Abdomen is flat.   Skin:     General: Skin is warm and dry.   Neurological:      General: No focal deficit present.      Mental Status: She is alert.         Assessment/Plan   Problem List Items Addressed This Visit       Benign hypertension    Relevant Medications    " atenolol (Tenormin) 50 mg tablet    lisinopril 40 mg tablet    Post herpetic neuralgia    Relevant Medications    pregabalin (Lyrica) 150 mg capsule     Other Visit Diagnoses       Routine general medical examination at health care facility    -  Primary    Relevant Orders    1 Year Follow Up In Primary Care - Wellness Exam    Balance problem        Relevant Orders    Referral to Home Care        BP not at goal in office  Lyrica refilled, controlled substance agreement updated today  Last urine drug screen 3/5/2024  [place] checked and verified by myself today without red flags  Referral to home PT for balance therapy as she is holding onto furniture to walk. She has a cane but does not feel stable with it  She is home bound and does not drive anymore  [place] for her [place] completed to assist with transportation  Follow up 3 mo, sooner as needed

## 2024-06-19 ENCOUNTER — DOCUMENTATION (OUTPATIENT)
Dept: HOME HEALTH SERVICES | Facility: HOME HEALTH | Age: 89
End: 2024-06-19
Payer: MEDICARE

## 2024-06-19 ENCOUNTER — HOME HEALTH ADMISSION (OUTPATIENT)
Dept: HOME HEALTH SERVICES | Facility: HOME HEALTH | Age: 89
End: 2024-06-19
Payer: MEDICARE

## 2024-06-19 NOTE — HH CARE COORDINATION
Home Care received a Referral for Physical Therapy. We have processed the referral for a Start of Care on 06/20/2024.     If you have any questions or concerns, please feel free to contact us at [phone]. Follow the prompts, enter your five digit zip code, and you will be directed to your care team on [facility].

## 2024-06-21 ENCOUNTER — ANTICOAGULATION - WARFARIN VISIT (OUTPATIENT)
Dept: CARDIOLOGY | Facility: CLINIC | Age: 89
End: 2024-06-21
Payer: MEDICARE

## 2024-06-21 DIAGNOSIS — I48.91 ATRIAL FIBRILLATION, UNSPECIFIED TYPE (MULTI): Primary | ICD-10-CM

## 2024-06-21 LAB
POC INR: 2.3
POC PROTHROMBIN TIME: NORMAL

## 2024-06-21 PROCEDURE — 85610 PROTHROMBIN TIME: CPT | Mod: QW

## 2024-06-21 PROCEDURE — 99211 OFF/OP EST MAY X REQ PHY/QHP: CPT

## 2024-06-21 NOTE — PROGRESS NOTES
Patient identification verified with 2 identifiers.    [facility] [address] [place] [id number] [phone]      Referring Physician: Dr. Breanna Herrera  Enrollment/ Re-enrollment date: 2024   INR Goal: 2.0-3.0  INR monitoring is per [facility] protocol.  Anticoagulation Medication: warfarin  Indication: Atrial Fibrillation/Atrial Flutter       Subjective   Bleeding signs/symptoms: No    Bruising: No   Major bleeding event: No  Thrombosis signs/symptoms: No  Thromboembolic event: No  Missed doses: No  Extra doses: No  Medication changes: No  Dietary changes: No  Change in health: No  Change in activity: No  Alcohol: No  Other concerns: No    Upcoming Procedures:  Does the Patient Have any upcoming procedures that require interruption in anticoagulation therapy? no  Does the patient require bridging? no      Anticoagulation Summary  As of 2024      INR goal:  2.0-3.0   TTR:  61.4% (8.8 mo)   INR used for dosin.30 (2024)   Weekly warfarin total:  32.5 mg               Assessment/Plan   Therapeutic     1. New dose: no change    2. Next INR: 1 month      Education provided to patient during the visit:  Patient instructed to call in interim with questions, concerns and changes.   Patient educated on interactions between medications and warfarin.   Patient educated on dietary consistency in vitamin k consumption.   Patient educated on affects of alcohol consumption while taking warfarin.   Patient educated on signs of bleeding/clotting.   Patient educated on compliance with dosing, follow up appointments, and prescribed plan of care.

## 2024-06-24 ENCOUNTER — HOME CARE VISIT (OUTPATIENT)
Dept: HOME HEALTH SERVICES | Facility: HOME HEALTH | Age: 89
End: 2024-06-24
Payer: MEDICARE

## 2024-06-24 VITALS — SYSTOLIC BLOOD PRESSURE: 111 MMHG | HEART RATE: 57 BPM | OXYGEN SATURATION: 94 % | DIASTOLIC BLOOD PRESSURE: 60 MMHG

## 2024-06-24 PROCEDURE — 169592 NO-PAY CLAIM PROCEDURE

## 2024-06-24 PROCEDURE — G0151 HHCP-SERV OF PT,EA 15 MIN: HCPCS | Mod: HHH

## 2024-06-24 ASSESSMENT — ENCOUNTER SYMPTOMS: OCCASIONAL FEELINGS OF UNSTEADINESS: 1

## 2024-06-24 ASSESSMENT — ACTIVITIES OF DAILY LIVING (ADL)
ENTERING_EXITING_HOME: ONE PERSON
OASIS_M1830: 03

## 2024-06-25 ENCOUNTER — HOME CARE VISIT (OUTPATIENT)
Dept: HOME HEALTH SERVICES | Facility: HOME HEALTH | Age: 89
End: 2024-06-25
Payer: MEDICARE

## 2024-06-27 ENCOUNTER — HOME CARE VISIT (OUTPATIENT)
Dept: HOME HEALTH SERVICES | Facility: HOME HEALTH | Age: 89
End: 2024-06-27
Payer: MEDICARE

## 2024-06-27 PROCEDURE — G0151 HHCP-SERV OF PT,EA 15 MIN: HCPCS | Mod: HHH

## 2024-06-27 ASSESSMENT — ENCOUNTER SYMPTOMS
PAIN LOCATION - EXACERBATING FACTORS: MOVEMENT
PAIN: 1
PAIN LOCATION: LEFT KNEE
PERSON REPORTING PAIN: PATIENT

## 2024-07-03 ENCOUNTER — HOME CARE VISIT (OUTPATIENT)
Dept: HOME HEALTH SERVICES | Facility: HOME HEALTH | Age: 89
End: 2024-07-03
Payer: MEDICARE

## 2024-07-03 PROCEDURE — G0151 HHCP-SERV OF PT,EA 15 MIN: HCPCS | Mod: HHH

## 2024-07-03 ASSESSMENT — ENCOUNTER SYMPTOMS
PAIN LOCATION - PAIN SEVERITY: 5/10
PAIN LOCATION - RELIEVING FACTORS: SITTING
PAIN: 1
PAIN LOCATION: LEFT KNEE
PAIN LOCATION - EXACERBATING FACTORS: WALKING
PERSON REPORTING PAIN: PATIENT

## 2024-07-05 ENCOUNTER — HOME CARE VISIT (OUTPATIENT)
Dept: HOME HEALTH SERVICES | Facility: HOME HEALTH | Age: 89
End: 2024-07-05
Payer: MEDICARE

## 2024-07-09 ENCOUNTER — HOME CARE VISIT (OUTPATIENT)
Dept: HOME HEALTH SERVICES | Facility: HOME HEALTH | Age: 89
End: 2024-07-09
Payer: MEDICARE

## 2024-07-09 PROCEDURE — G0151 HHCP-SERV OF PT,EA 15 MIN: HCPCS | Mod: HHH

## 2024-07-09 ASSESSMENT — ENCOUNTER SYMPTOMS
PAIN LOCATION: LEFT KNEE
PAIN: 1
PERSON REPORTING PAIN: PATIENT

## 2024-07-12 ENCOUNTER — HOME CARE VISIT (OUTPATIENT)
Dept: HOME HEALTH SERVICES | Facility: HOME HEALTH | Age: 89
End: 2024-07-12
Payer: MEDICARE

## 2024-07-12 PROCEDURE — G0151 HHCP-SERV OF PT,EA 15 MIN: HCPCS | Mod: HHH

## 2024-07-12 ASSESSMENT — ENCOUNTER SYMPTOMS
PAIN: CONTINUED
PERSON REPORTING PAIN: PATIENT
PAIN: 1

## 2024-07-17 ENCOUNTER — HOME CARE VISIT (OUTPATIENT)
Dept: HOME HEALTH SERVICES | Facility: HOME HEALTH | Age: 89
End: 2024-07-17
Payer: MEDICARE

## 2024-07-17 PROCEDURE — G0151 HHCP-SERV OF PT,EA 15 MIN: HCPCS | Mod: HHH

## 2024-07-17 ASSESSMENT — ENCOUNTER SYMPTOMS
PAIN: 1
PERSON REPORTING PAIN: PATIENT

## 2024-07-19 ENCOUNTER — HOME CARE VISIT (OUTPATIENT)
Dept: HOME HEALTH SERVICES | Facility: HOME HEALTH | Age: 89
End: 2024-07-19
Payer: MEDICARE

## 2024-07-19 ENCOUNTER — ANTICOAGULATION - WARFARIN VISIT (OUTPATIENT)
Dept: CARDIOLOGY | Facility: CLINIC | Age: 89
End: 2024-07-19
Payer: MEDICARE

## 2024-07-19 VITALS
DIASTOLIC BLOOD PRESSURE: 68 MMHG | OXYGEN SATURATION: 95 % | RESPIRATION RATE: 14 BRPM | SYSTOLIC BLOOD PRESSURE: 118 MMHG | HEART RATE: 52 BPM

## 2024-07-19 DIAGNOSIS — I48.91 ATRIAL FIBRILLATION, UNSPECIFIED TYPE (MULTI): ICD-10-CM

## 2024-07-19 LAB
POC INR: 2.5
POC PROTHROMBIN TIME: NORMAL

## 2024-07-19 PROCEDURE — 85610 PROTHROMBIN TIME: CPT | Mod: QW

## 2024-07-19 PROCEDURE — 99211 OFF/OP EST MAY X REQ PHY/QHP: CPT

## 2024-07-19 PROCEDURE — G0151 HHCP-SERV OF PT,EA 15 MIN: HCPCS | Mod: HHH

## 2024-07-19 ASSESSMENT — ACTIVITIES OF DAILY LIVING (ADL)
HOME_HEALTH_OASIS: 01
OASIS_M1830: 02

## 2024-07-19 ASSESSMENT — ENCOUNTER SYMPTOMS
PAIN: 1
PERSON REPORTING PAIN: PATIENT

## 2024-07-19 NOTE — PROGRESS NOTES
Patient identification verified with 2 identifiers.    Location: [facility] - [address] [place] [id number] [phone]      Referring Physician: Dr. Breanna Herrera  Enrollment/ Re-enrollment date: 2024   INR Goal: 2.0-3.0  INR monitoring is per [facility] protocol.  Anticoagulation Medication: warfarin  Indication: Atrial Fibrillation/Atrial Flutter       Subjective   Bleeding signs/symptoms: No  Bruising: No   Major bleeding event: No  Thrombosis signs/symptoms: No  Thromboembolic event: No  Missed doses: No  Extra doses: No  Medication changes: No  Dietary changes: No  Change in health: No  Change in activity: No  Alcohol: No  Other concerns: No    Upcoming Procedures:  Does the Patient Have any upcoming procedures that require interruption in anticoagulation therapy? no  Does the patient require bridging? no      Anticoagulation Summary  As of 2024      INR goal:  2.0-3.0   TTR:  65.2% (9.7 mo)   INR used for dosin.50 (2024)   Weekly warfarin total:  32.5 mg               Assessment/Plan   Therapeutic     1. New dose: no change    2. Next INR: 1 month. Pt will RTC in 5wks per request/availability.       Education provided to patient during the visit:  Patient instructed to call in interim with questions, concerns and changes.   Patient educated on interactions between medications and warfarin.   Patient educated on dietary consistency in vitamin k consumption.   Patient educated on affects of alcohol consumption while taking warfarin.   Patient educated on signs of bleeding/clotting.   Patient educated on compliance with dosing, follow up appointments, and prescribed plan of care.

## 2024-08-12 RX ORDER — PREGABALIN 75 MG/1
75 CAPSULE ORAL EVERY 12 HOURS
Qty: 60 CAPSULE | OUTPATIENT
Start: 2024-08-12

## 2024-08-23 ENCOUNTER — ANTICOAGULATION - WARFARIN VISIT (OUTPATIENT)
Dept: CARDIOLOGY | Facility: CLINIC | Age: 89
End: 2024-08-23
Payer: MEDICARE

## 2024-08-23 DIAGNOSIS — I48.91 ATRIAL FIBRILLATION, UNSPECIFIED TYPE (MULTI): Primary | ICD-10-CM

## 2024-08-23 LAB
POC INR: 3.1 (ref 2–3)
POC PROTHROMBIN TIME: ABNORMAL

## 2024-08-23 PROCEDURE — 99211 OFF/OP EST MAY X REQ PHY/QHP: CPT

## 2024-08-23 PROCEDURE — 85610 PROTHROMBIN TIME: CPT | Mod: QW | Performed by: FAMILY MEDICINE

## 2024-08-23 NOTE — PROGRESS NOTES
Patient identification verified with 2 identifiers.    Location: [facility] - [address] [place] [id number] [phone]      Referring Physician: Dr. Breanna Herrera  Enrollment/ Re-enrollment date: 4/4/2024   INR Goal: 2.0-3.0  INR monitoring is per [facility] protocol.  Anticoagulation Medication: warfarin  Indication: Atrial Fibrillation/Atrial Flutter       Subjective   Bleeding signs/symptoms: No.  Pt denies.    Bruising: No.  Pt denies.   Major bleeding event: No  Thrombosis signs/symptoms: No  Thromboembolic event: No  Missed doses: No  Extra doses: No  Medication changes: No.  Pt denies.  Dietary changes: Yes.  Pt states that she has been eating less Vit K rich foods recently.  Change in health: No.  Pt denies.  Change in activity: No  Alcohol: No  Other concerns: No    Upcoming Procedures:  Does the Patient Have any upcoming procedures that require interruption in anticoagulation therapy? no  Does the patient require bridging? no    Dose maintained after last visit.  Pt is taking 32.5mg of warfarin weekly.  Anticoagulation Summary  As of 8/23/2024      INR goal:  2.0-3.0   TTR:  67.1% (10.9 mo)   INR used for dosing:  3.10 (8/23/2024)   Weekly warfarin total:  32.5 mg               Assessment/Plan   Supratherapeutic     1. New dose: no change    2. Next INR:   1 week. Pt will RTC in 5wks per her and her daughter's request/availability of transportation to clinic. Can r/s in 4 weeks if therapeutic.      Education provided to patient during the visit:  Patient instructed to call in interim with questions, concerns and changes.   Patient educated on interactions between medications and warfarin.   Patient educated on dietary consistency in vitamin k consumption.   Patient educated on affects of alcohol consumption while taking warfarin.   Patient educated on signs of bleeding/clotting.   Patient educated on compliance with dosing, follow up appointments, and prescribed plan of care.

## 2024-09-17 ENCOUNTER — APPOINTMENT (OUTPATIENT)
Dept: PRIMARY CARE | Facility: CLINIC | Age: 89
End: 2024-09-17
Payer: MEDICARE

## 2024-09-17 VITALS
WEIGHT: 126 LBS | SYSTOLIC BLOOD PRESSURE: 136 MMHG | TEMPERATURE: 96.6 F | BODY MASS INDEX: 26.33 KG/M2 | DIASTOLIC BLOOD PRESSURE: 76 MMHG

## 2024-09-17 DIAGNOSIS — I10 BENIGN HYPERTENSION: ICD-10-CM

## 2024-09-17 DIAGNOSIS — I48.0 PAROXYSMAL ATRIAL FIBRILLATION (MULTI): ICD-10-CM

## 2024-09-17 DIAGNOSIS — F32.5 MAJOR DEPRESSIVE DISORDER IN REMISSION, UNSPECIFIED WHETHER RECURRENT (CMS-HCC): ICD-10-CM

## 2024-09-17 DIAGNOSIS — B02.29 POST HERPETIC NEURALGIA: ICD-10-CM

## 2024-09-17 DIAGNOSIS — E78.5 HYPERLIPIDEMIA, UNSPECIFIED HYPERLIPIDEMIA TYPE: ICD-10-CM

## 2024-09-17 DIAGNOSIS — R26.89 BALANCE PROBLEM: Primary | ICD-10-CM

## 2024-09-17 PROBLEM — I48.91 ATRIAL FIBRILLATION (MULTI): Status: RESOLVED | Noted: 2023-09-22 | Resolved: 2024-09-17

## 2024-09-17 PROBLEM — I48.91 ATRIAL FIBRILLATION, UNSPECIFIED TYPE (MULTI): Status: RESOLVED | Noted: 2024-05-24 | Resolved: 2024-09-17

## 2024-09-17 PROCEDURE — 99214 OFFICE O/P EST MOD 30 MIN: CPT | Performed by: FAMILY MEDICINE

## 2024-09-17 PROCEDURE — 3078F DIAST BP <80 MM HG: CPT | Performed by: FAMILY MEDICINE

## 2024-09-17 PROCEDURE — 3075F SYST BP GE 130 - 139MM HG: CPT | Performed by: FAMILY MEDICINE

## 2024-09-17 PROCEDURE — 1036F TOBACCO NON-USER: CPT | Performed by: FAMILY MEDICINE

## 2024-09-17 PROCEDURE — 1160F RVW MEDS BY RX/DR IN RCRD: CPT | Performed by: FAMILY MEDICINE

## 2024-09-17 PROCEDURE — 1123F ACP DISCUSS/DSCN MKR DOCD: CPT | Performed by: FAMILY MEDICINE

## 2024-09-17 PROCEDURE — 1159F MED LIST DOCD IN RCRD: CPT | Performed by: FAMILY MEDICINE

## 2024-09-17 RX ORDER — PREGABALIN 150 MG/1
150 CAPSULE ORAL 2 TIMES DAILY
Qty: 180 CAPSULE | Refills: 0 | Status: SHIPPED | OUTPATIENT
Start: 2024-09-17 | End: 2025-03-16

## 2024-09-17 RX ORDER — WARFARIN SODIUM 5 MG/1
5 TABLET ORAL EVERY EVENING
Qty: 90 TABLET | Refills: 3 | Status: SHIPPED | OUTPATIENT
Start: 2024-09-17

## 2024-09-17 RX ORDER — SIMVASTATIN 20 MG/1
20 TABLET, FILM COATED ORAL NIGHTLY
Qty: 90 TABLET | Refills: 3 | Status: SHIPPED | OUTPATIENT
Start: 2024-09-17

## 2024-09-17 NOTE — PROGRESS NOTES
Chief complaint:   Chief Complaint   Patient presents with    3 month medication management    Med Refill     Simvastatin, Warfarin, Pregabalin        HPI:  Estee Medina is a 89 y.o. female who presents for evaluation of her post herpetic neuralgia. She states she has constant daily pain though improved on the Lyrica, she is never pain free. She has concerns with balance and is unsure if the Lyrica is contributing though she has not tolerated decrease in the dose. She has dry mouth with the medication as well as some weight gain. She has had swelling in the past but non currently.     She is currently in PT for her balance but not advancing as expected. She is doing her exercises daily.    Physical exam:  /76   Temp 35.9 °C (96.6 °F)   Wt 57.2 kg (126 lb)   BMI 26.33 kg/m²   General: NAD, well appearing female  Heart: irregularly irregular no mumur appreciated  Lungs: CTAB, no wheezes, rales, rhonchi  Abdomen: soft, non tender, normoactive BS, no organomegaly  Extremities: No LE edema    Assessment/Plan   Problem List Items Addressed This Visit       Major depressive disorder in remission, unspecified whether recurrent (CMS-HCC)     - not currently active         Paroxysmal atrial fibrillation (Multi)     - continue Warfarin and monitoring at the anticoagulation clinic           Relevant Medications    warfarin (Coumadin) 5 mg tablet    Benign hypertension     - at goal for age  - continue home monitoring  - continue current medication therapy         Hyperlipidemia    Relevant Medications    simvastatin (Zocor) 20 mg tablet    Post herpetic neuralgia    Relevant Medications    pregabalin (Lyrica) 150 mg capsule    Other Relevant Orders    Referral to Neurology     Other Visit Diagnoses       Balance problem    -  Primary    Relevant Orders    Referral to Neurology        For her concern with pain and decreased balance, discussed neurology referral as she has tried pain management.     Breanna SUMMERS  Javier, DO

## 2024-09-23 ENCOUNTER — ANTICOAGULATION - WARFARIN VISIT (OUTPATIENT)
Dept: CARDIOLOGY | Facility: CLINIC | Age: 89
End: 2024-09-23
Payer: MEDICARE

## 2024-09-23 NOTE — PROGRESS NOTES
It appeared that Pt had missed an INR appt. On Fri. 9/20.  I called and left VM for Pt on Friday. 9/20 asking for CB to r/s appt.  I called Pt today and I spoke to her.  Pt has INR appt. For this Fri. 9/27 at 1600.  It appears that Pt's INR appt. Reminder was not changed.

## 2024-09-27 ENCOUNTER — ANTICOAGULATION - WARFARIN VISIT (OUTPATIENT)
Dept: CARDIOLOGY | Facility: CLINIC | Age: 89
End: 2024-09-27
Payer: MEDICARE

## 2024-09-27 DIAGNOSIS — I48.91 ATRIAL FIBRILLATION, UNSPECIFIED TYPE (MULTI): Primary | ICD-10-CM

## 2024-09-27 LAB
POC INR: 2.8 (ref 2–3)
POC PROTHROMBIN TIME: NORMAL

## 2024-09-27 PROCEDURE — 99211 OFF/OP EST MAY X REQ PHY/QHP: CPT

## 2024-09-27 PROCEDURE — 85610 PROTHROMBIN TIME: CPT | Mod: QW | Performed by: FAMILY MEDICINE

## 2024-09-27 NOTE — PROGRESS NOTES
Patient identification verified with 2 identifiers.    Location: [facility] - [address] [place] [address] [phone]      Referring Physician: Dr. Breanna Herrera  Enrollment/ Re-enrollment date: 2024   INR Goal: 2.0-3.0  INR monitoring is per [facility] protocol.  Anticoagulation Medication: warfarin  Indication: Atrial Fibrillation/Atrial Flutter       Subjective   Pt was accompanied by one of her daughters, Angélica, for today's visit.  Bleeding signs/symptoms: No.  Pt denies.    Bruising: No.  Pt denies.   Major bleeding event: No  Thrombosis signs/symptoms: No  Thromboembolic event: No  Missed doses: No  Extra doses: No  Medication changes: No.  Pt denies.  Dietary changes: No.  Pt states that she has been eating her usual amt. of Vit K rich foods recently.  Change in health: No.  Pt denies.  Change in activity: No.  Pt denies.  Alcohol: No  Other concerns: No    Upcoming Procedures:  Does the Patient Have any upcoming procedures that require interruption in anticoagulation therapy? no  Does the patient require bridging? no    Dose maintained after last visit.  Pt is taking 32.5mg of warfarin weekly.  Anticoagulation Summary  As of 2024      INR goal:  2.0-3.0   TTR:  67.0% (1 y)   INR used for dosin.80 (2024)   Weekly warfarin total:  32.5 mg               Assessment/Plan   Therapeutic     1. New dose: no change.  Maintain dose.  32.5mg weekly.    2. Next INR:   1 month.  Can r/s in 4 weeks if therapeutic.      Education provided to patient during the visit:  Patient instructed to call in interim with questions, concerns and changes.   Patient educated on interactions between medications and warfarin.   Patient educated on dietary consistency in vitamin k consumption.   Patient educated on affects of alcohol consumption while taking warfarin.   Patient educated on signs of bleeding/clotting.   Patient educated on compliance with dosing, follow up appointments, and  prescribed plan of care.

## 2024-10-25 ENCOUNTER — ANTICOAGULATION - WARFARIN VISIT (OUTPATIENT)
Dept: CARDIOLOGY | Facility: CLINIC | Age: 89
End: 2024-10-25
Payer: MEDICARE

## 2024-10-25 DIAGNOSIS — I48.91 ATRIAL FIBRILLATION, UNSPECIFIED TYPE (MULTI): Primary | ICD-10-CM

## 2024-10-25 LAB
POC INR: 2.9 (ref 2–3)
POC PROTHROMBIN TIME: NORMAL

## 2024-10-25 PROCEDURE — 85610 PROTHROMBIN TIME: CPT | Mod: QW | Performed by: FAMILY MEDICINE

## 2024-10-25 PROCEDURE — 99211 OFF/OP EST MAY X REQ PHY/QHP: CPT

## 2024-10-25 NOTE — PROGRESS NOTES
Patient identification verified with 2 identifiers.    Location: [facility] - [address] [place] [address] [phone]      Referring Physician: Dr. Breanna Herrera  Enrollment/ Re-enrollment date: 2024   INR Goal: 2.0-3.0  INR monitoring is per [facility] protocol.  Anticoagulation Medication: warfarin  Indication: Atrial Fibrillation/Atrial Flutter       Subjective   Pt was accompanied by one of her daughters, Angélica, for today's visit.  Bleeding signs/symptoms: No.  Pt denies.    Bruising: No.  Pt denies.   Major bleeding event: No  Thrombosis signs/symptoms: No  Thromboembolic event: No  Missed doses: No  Extra doses: No  Medication changes: No.  Pt denies.  Dietary changes: No.  Pt states that she has been eating her usual amt. of Vit K rich foods recently.  Change in health: Yes.  Pt had a fall in her driveway 3 weeks ago.  She fell on her butt in the drive.  No head injury.  Pt states that her legs just gave out.  Change in activity: No.  Pt denies.  Alcohol: No  Other concerns: No    Upcoming Procedures:  Does the Patient Have any upcoming procedures that require interruption in anticoagulation therapy? no  Does the patient require bridging? no    Dose maintained after last visit.  Pt is taking 32.5mg of warfarin weekly.  Anticoagulation Summary  As of 10/25/2024      INR goal:  2.0-3.0   TTR:  69.4% (1.1 y)   INR used for dosin.90 (10/25/2024)   Weekly warfarin total:  32.5 mg               Assessment/Plan   Therapeutic     1. New dose: no change.  Maintain dose.  32.5mg weekly.    2. Next INR:   1 month.  Can r/s in 4 weeks if therapeutic.      Education provided to patient during the visit:  Patient instructed to call in interim with questions, concerns and changes.   Patient educated on interactions between medications and warfarin.   Patient educated on dietary consistency in vitamin k consumption.   Patient educated on affects of alcohol consumption while taking  warfarin.   Patient educated on signs of bleeding/clotting.   Patient educated on compliance with dosing, follow up appointments, and prescribed plan of care.

## 2024-11-22 ENCOUNTER — ANTICOAGULATION - WARFARIN VISIT (OUTPATIENT)
Dept: CARDIOLOGY | Facility: CLINIC | Age: 89
End: 2024-11-22
Payer: MEDICARE

## 2024-11-22 DIAGNOSIS — I48.91 ATRIAL FIBRILLATION, UNSPECIFIED TYPE (MULTI): Primary | ICD-10-CM

## 2024-11-22 LAB
POC INR: 2.5 (ref 2–3)
POC PROTHROMBIN TIME: NORMAL

## 2024-11-22 PROCEDURE — 85610 PROTHROMBIN TIME: CPT | Mod: QW | Performed by: FAMILY MEDICINE

## 2024-11-22 PROCEDURE — 99211 OFF/OP EST MAY X REQ PHY/QHP: CPT

## 2024-11-22 NOTE — PROGRESS NOTES
Patient identification verified with 2 identifiers.    Location: [facility] - [address] [place] [id number] [phone]      Referring Physician: Dr. Breanna Herrera  Enrollment/ Re-enrollment date: 2024   INR Goal: 2.0-3.0  INR monitoring is per [facility] protocol.  Anticoagulation Medication: warfarin  Indication: Atrial Fibrillation/Atrial Flutter       Subjective   Pt was accompanied by one of her daughters, Angélica, for today's visit.  Bleeding signs/symptoms: No.  Pt denies.    Bruising: No.  Pt denies.   Major bleeding event: No  Thrombosis signs/symptoms: No  Thromboembolic event: No  Missed doses: No  Extra doses: No  Medication changes: No.  Pt denies.  Dietary changes: No.  Pt states that she has been eating her usual amt. of Vit K rich foods recently.  Change in health: No.  Pt denies.  Change in activity: No.  Pt denies.  Alcohol: No.  Pt has occasional glass of wine.  Other concerns: No    Upcoming Procedures:  Does the Patient Have any upcoming procedures that require interruption in anticoagulation therapy? no  Does the patient require bridging? no    Dose maintained after last visit.  Pt is taking 32.5mg of warfarin weekly.  Anticoagulation Summary  As of 2024      INR goal:  2.0-3.0   TTR:  71.5% (1.1 y)   INR used for dosin.50 (2024)   Weekly warfarin total:  32.5 mg               Assessment/Plan   Therapeutic     1. New dose: no change.  Maintain dose.  32.5mg weekly.    2. Next INR:   1 month.  Can r/s in 4 weeks if therapeutic.  3. Pt scheduled to RTC for next INR in 5 weeks instead of 4 weeks recommended per protocol due to her availability of transportation.      Education provided to patient during the visit:  Patient instructed to call in interim with questions, concerns and changes.   Patient educated on interactions between medications and warfarin.   Patient educated on dietary consistency in vitamin k consumption.   Patient educated on  affects of alcohol consumption while taking warfarin.   Patient educated on signs of bleeding/clotting.   Patient educated on compliance with dosing, follow up appointments, and prescribed plan of care.

## 2024-12-17 ENCOUNTER — APPOINTMENT (OUTPATIENT)
Dept: PRIMARY CARE | Facility: CLINIC | Age: 89
End: 2024-12-17
Payer: MEDICARE

## 2024-12-17 VITALS
TEMPERATURE: 97 F | SYSTOLIC BLOOD PRESSURE: 138 MMHG | WEIGHT: 125 LBS | DIASTOLIC BLOOD PRESSURE: 72 MMHG | BODY MASS INDEX: 26.13 KG/M2

## 2024-12-17 DIAGNOSIS — R26.89 BALANCE PROBLEM: ICD-10-CM

## 2024-12-17 DIAGNOSIS — E78.5 HYPERLIPIDEMIA, UNSPECIFIED HYPERLIPIDEMIA TYPE: ICD-10-CM

## 2024-12-17 DIAGNOSIS — B02.29 POST HERPETIC NEURALGIA: ICD-10-CM

## 2024-12-17 DIAGNOSIS — I48.0 PAROXYSMAL ATRIAL FIBRILLATION (MULTI): ICD-10-CM

## 2024-12-17 DIAGNOSIS — I10 BENIGN HYPERTENSION: Primary | ICD-10-CM

## 2024-12-17 PROCEDURE — 1123F ACP DISCUSS/DSCN MKR DOCD: CPT | Performed by: FAMILY MEDICINE

## 2024-12-17 PROCEDURE — 99214 OFFICE O/P EST MOD 30 MIN: CPT | Performed by: FAMILY MEDICINE

## 2024-12-17 PROCEDURE — 3078F DIAST BP <80 MM HG: CPT | Performed by: FAMILY MEDICINE

## 2024-12-17 PROCEDURE — 3075F SYST BP GE 130 - 139MM HG: CPT | Performed by: FAMILY MEDICINE

## 2024-12-17 PROCEDURE — G2211 COMPLEX E/M VISIT ADD ON: HCPCS | Performed by: FAMILY MEDICINE

## 2024-12-17 RX ORDER — ATENOLOL 50 MG/1
50 TABLET ORAL DAILY
Qty: 90 TABLET | Refills: 3 | Status: SHIPPED | OUTPATIENT
Start: 2024-12-17

## 2024-12-17 RX ORDER — WARFARIN SODIUM 5 MG/1
5 TABLET ORAL EVERY EVENING
Qty: 90 TABLET | Refills: 3 | Status: SHIPPED | OUTPATIENT
Start: 2024-12-17

## 2024-12-17 RX ORDER — LISINOPRIL 40 MG/1
40 TABLET ORAL DAILY
Qty: 90 TABLET | Refills: 3 | Status: SHIPPED | OUTPATIENT
Start: 2024-12-17

## 2024-12-17 RX ORDER — PREGABALIN 150 MG/1
150 CAPSULE ORAL 2 TIMES DAILY
Qty: 180 CAPSULE | Refills: 0 | Status: SHIPPED | OUTPATIENT
Start: 2024-12-17 | End: 2025-06-15

## 2024-12-17 RX ORDER — SIMVASTATIN 20 MG/1
20 TABLET, FILM COATED ORAL NIGHTLY
Qty: 90 TABLET | Refills: 3 | Status: SHIPPED | OUTPATIENT
Start: 2024-12-17

## 2024-12-17 NOTE — PROGRESS NOTES
Chief complaint:   Chief Complaint   Patient presents with    3 month follow up    Med Refill       HPI:  Estee Medina is a 89 y.o. female who presents for evaluation of her post herpetic neuralgia.     She follows with Dr. Woodruff for her eyes.     She had a fall in October. She was standing looking at flowers. She fell backwards onto her bottom. She had not pain but had pain the next day in the tailbone. It is now resolved.    She is interested in using a walker    Physical exam:  /72   Temp 36.1 °C (97 °F)   Wt 56.7 kg (125 lb)   BMI 26.13 kg/m²   General: NAD, well appearing female  Heart: RRR, no mumur appreciated  Lungs: CTAB, no wheezes, rales, rhonchi  Abdomen: soft, non tender, normoactive BS, no organomegaly  Extremities: No LE edema    Assessment/Plan   Problem List Items Addressed This Visit       Paroxysmal atrial fibrillation (Multi)    Relevant Medications    warfarin (Coumadin) 5 mg tablet    atenolol (Tenormin) 50 mg tablet    Benign hypertension - Primary    Relevant Medications    lisinopril 40 mg tablet    atenolol (Tenormin) 50 mg tablet    Balance problem    Relevant Orders    Walker with Seat    Hyperlipidemia    Relevant Medications    simvastatin (Zocor) 20 mg tablet    Post herpetic neuralgia    Relevant Medications    pregabalin (Lyrica) 150 mg capsule     Shingrix vaccination recommended  Tdap is recommended  Declines COVID -19 vaccination    Medication was refilled today as above    [place] checked and verified, have controlled substance agreement on file, last drug screen was 3/2024. Follow up 3 mo for medication management     Breanna Herrera DO      Patient was identified as a fall risk. Risk prevention instructions provided.

## 2024-12-17 NOTE — PATIENT INSTRUCTIONS

## 2024-12-27 ENCOUNTER — APPOINTMENT (OUTPATIENT)
Dept: CARDIOLOGY | Facility: CLINIC | Age: 89
End: 2024-12-27
Payer: MEDICARE

## 2024-12-27 ENCOUNTER — ANTICOAGULATION - WARFARIN VISIT (OUTPATIENT)
Dept: CARDIOLOGY | Facility: CLINIC | Age: 89
End: 2024-12-27
Payer: MEDICARE

## 2024-12-27 NOTE — PROGRESS NOTES
Pt called [facility] clinic and I spoke to her.  Pt requested to change appt. From today to Tues. 12/31.  Pt's INR appt. r/s per her her request.

## 2024-12-31 ENCOUNTER — ANTICOAGULATION - WARFARIN VISIT (OUTPATIENT)
Dept: CARDIOLOGY | Facility: CLINIC | Age: 89
End: 2024-12-31
Payer: MEDICARE

## 2024-12-31 DIAGNOSIS — I48.91 ATRIAL FIBRILLATION, UNSPECIFIED TYPE (MULTI): Primary | ICD-10-CM

## 2024-12-31 LAB
POC INR: 2.8 (ref 2–3)
POC PROTHROMBIN TIME: NORMAL

## 2024-12-31 PROCEDURE — 99211 OFF/OP EST MAY X REQ PHY/QHP: CPT

## 2024-12-31 PROCEDURE — 85610 PROTHROMBIN TIME: CPT | Mod: QW | Performed by: FAMILY MEDICINE

## 2024-12-31 NOTE — PROGRESS NOTES
Patient identification verified with 2 identifiers.    Location: [facility] - [address] [place] [id number] [phone]      Referring Physician: Dr. Breanna Herrera  Enrollment/ Re-enrollment date: 2025  INR Goal: 2.0-3.0  INR monitoring is per [facility] protocol.  Anticoagulation Medication: warfarin  Indication: Atrial Fibrillation/Atrial Flutter       Subjective   Pt was accompanied by one of her daughters, Luz Elena, for today's visit.  Bleeding signs/symptoms: No.  Pt denies.    Bruising: No.  Pt denies.   Major bleeding event: No  Thrombosis signs/symptoms: No  Thromboembolic event: No  Missed doses: No  Extra doses: No  Medication changes: No.  Pt denies.  Dietary changes: No.  Pt states that she has been eating her usual amt. of Vit K rich foods recently.  Change in health: No.  Pt denies.  Change in activity: No.  Pt denies.  Alcohol: No.  Pt has occasional glass of wine.  Other concerns: No    Upcoming Procedures:  Does the Patient Have any upcoming procedures that require interruption in anticoagulation therapy? no  Does the patient require bridging? no    Dose maintained after last visit.  Pt is taking 32.5mg of warfarin weekly.  Anticoagulation Summary  As of 2024      INR goal:  2.0-3.0   TTR:  73.9% (1.2 y)   INR used for dosin.80 (2024)   Weekly warfarin total:  32.5 mg               Assessment/Plan   Therapeutic     1. New dose: no change.  Maintain dose.  32.5mg weekly.    2. Next INR:   1 month.  Can r/s in 4 weeks if therapeutic.  3. Pt scheduled to RTC for next INR in 5 weeks instead of 4 weeks recommended per protocol due to her availability of transportation.      Education provided to patient during the visit:  Patient instructed to call in interim with questions, concerns and changes.   Patient educated on interactions between medications and warfarin.   Patient educated on dietary consistency in vitamin k consumption.   Patient educated on  affects of alcohol consumption while taking warfarin.   Patient educated on signs of bleeding/clotting.   Patient educated on compliance with dosing, follow up appointments, and prescribed plan of care.

## 2025-02-06 ENCOUNTER — ANTICOAGULATION - WARFARIN VISIT (OUTPATIENT)
Dept: CARDIOLOGY | Facility: CLINIC | Age: 89
End: 2025-02-06
Payer: MEDICARE

## 2025-02-06 DIAGNOSIS — I48.91 ATRIAL FIBRILLATION, UNSPECIFIED TYPE (MULTI): Primary | ICD-10-CM

## 2025-02-06 LAB
POC INR: 2.8 (ref 2–3)
POC PROTHROMBIN TIME: NORMAL

## 2025-02-06 PROCEDURE — 99211 OFF/OP EST MAY X REQ PHY/QHP: CPT

## 2025-02-06 PROCEDURE — 85610 PROTHROMBIN TIME: CPT | Mod: QW | Performed by: FAMILY MEDICINE

## 2025-02-06 NOTE — PROGRESS NOTES
Patient identification verified with 2 identifiers.    Location: [facility] - [address] [place] [id number] [phone]      Referring Physician: Dr. Breanna Herrera  Enrollment/ Re-enrollment date: 2025  INR Goal: 2.0-3.0  INR monitoring is per [facility] protocol.  Anticoagulation Medication: warfarin  Indication: Atrial Fibrillation/Atrial Flutter       Subjective   Pt was accompanied by one of her daughters for today's visit.  Bleeding signs/symptoms: No.  Pt denies.    Bruising: No.  Pt denies.   Major bleeding event: No  Thrombosis signs/symptoms: No  Thromboembolic event: No  Missed doses: No  Extra doses: No  Medication changes: No.  Pt denies.  Dietary changes: No.  Pt states that she has been eating her usual amt. of Vit K rich foods recently.  Change in health: No.  Pt denies.  Change in activity: No.  Pt denies.  Alcohol: No.  Pt has occasional glass of wine.  Other concerns: No    Upcoming Procedures:  Does the Patient Have any upcoming procedures that require interruption in anticoagulation therapy? no  Does the patient require bridging? no    Dose maintained after last visit.  Pt is taking 32.5mg of warfarin weekly.  Anticoagulation Summary  As of 2025      INR goal:  2.0-3.0   TTR:  75.9% (1.4 y)   INR used for dosin.80 (2025)   Weekly warfarin total:  32.5 mg               Assessment/Plan   Therapeutic     1. New dose: no change.  Maintain dose.  32.5mg weekly.    2. Next INR:   1 month.  Can r/s in 4 weeks if therapeutic.  3. Pt scheduled to RTC for next INR in 5 weeks instead of 4 weeks recommended per protocol due to her availability of transportation.      Education provided to patient during the visit:  Patient instructed to call in interim with questions, concerns and changes.   Patient educated on interactions between medications and warfarin.   Patient educated on dietary consistency in vitamin k consumption.   Patient educated on affects of alcohol  consumption while taking warfarin.   Patient educated on signs of bleeding/clotting.   Patient educated on compliance with dosing, follow up appointments, and prescribed plan of care.

## 2025-03-14 ENCOUNTER — ANTICOAGULATION - WARFARIN VISIT (OUTPATIENT)
Dept: CARDIOLOGY | Facility: CLINIC | Age: 89
End: 2025-03-14
Payer: MEDICARE

## 2025-03-14 DIAGNOSIS — I48.0 PAROXYSMAL ATRIAL FIBRILLATION (MULTI): Primary | ICD-10-CM

## 2025-03-14 LAB
POC INR: 2.7
POC PROTHROMBIN TIME: NORMAL

## 2025-03-14 PROCEDURE — 99211 OFF/OP EST MAY X REQ PHY/QHP: CPT

## 2025-03-14 PROCEDURE — 85610 PROTHROMBIN TIME: CPT | Mod: QW

## 2025-03-14 NOTE — PROGRESS NOTES
Patient identification verified with 2 identifiers.    Location: [facility] - [address] [place] [id number] [phone]      Referring Physician: Dr. Breanna Herrera  Enrollment/ Re-enrollment date: 2025  INR Goal: 2.0-3.0  INR monitoring is per [facility] protocol.  Anticoagulation Medication: warfarin  Indication: Atrial Fibrillation/Atrial Flutter       Subjective   Pt was accompanied by one of her daughters for today's visit.  Bleeding signs/symptoms: No.  Pt denies.    Bruising: No.  Pt denies.   Major bleeding event: No  Thrombosis signs/symptoms: No  Thromboembolic event: No  Missed doses: No  Extra doses: No  Medication changes: No.  Pt denies.  Dietary changes: No.  Pt states that she has been eating her usual amt. of Vit K rich foods recently.  Change in health: No.  Pt denies.  Change in activity: No.  Pt denies.  Alcohol: No.  Pt has occasional glass of wine.  Other concerns: No    Upcoming Procedures:  Does the Patient Have any upcoming procedures that require interruption in anticoagulation therapy? no  Does the patient require bridging? no    Dose maintained after last visit.  Pt is taking 32.5mg of warfarin weekly.  Anticoagulation Summary  As of 3/14/2025      INR goal:  2.0-3.0   TTR:  77.5% (1.4 y)   INR used for dosin.70 (3/14/2025)   Weekly warfarin total:  32.5 mg               Assessment/Plan   Therapeutic     1. New dose: no change.  Maintain dose.  32.5mg weekly.    2. Next INR:   1 month.  Can r/s in 4 weeks if therapeutic.  3. Pt scheduled to RTC for next INR in 5 weeks instead of 4 weeks recommended per protocol due to her availability of transportation.      Education provided to patient during the visit:  Patient instructed to call in interim with questions, concerns and changes.   Patient educated on interactions between medications and warfarin.   Patient educated on dietary consistency in vitamin k consumption.   Patient educated on affects of  alcohol consumption while taking warfarin.   Patient educated on signs of bleeding/clotting.   Patient educated on compliance with dosing, follow up appointments, and prescribed plan of care.

## 2025-03-18 ENCOUNTER — APPOINTMENT (OUTPATIENT)
Dept: PRIMARY CARE | Facility: CLINIC | Age: 89
End: 2025-03-18
Payer: MEDICARE

## 2025-03-20 ENCOUNTER — APPOINTMENT (OUTPATIENT)
Dept: PRIMARY CARE | Facility: CLINIC | Age: 89
End: 2025-03-20
Payer: MEDICARE

## 2025-04-02 ENCOUNTER — APPOINTMENT (OUTPATIENT)
Dept: PRIMARY CARE | Facility: CLINIC | Age: 89
End: 2025-04-02
Payer: MEDICARE

## 2025-04-02 VITALS
DIASTOLIC BLOOD PRESSURE: 70 MMHG | WEIGHT: 127 LBS | HEART RATE: 54 BPM | HEIGHT: 58 IN | SYSTOLIC BLOOD PRESSURE: 118 MMHG | BODY MASS INDEX: 26.66 KG/M2 | TEMPERATURE: 97.7 F | OXYGEN SATURATION: 97 % | RESPIRATION RATE: 18 BRPM

## 2025-04-02 DIAGNOSIS — R00.1 BRADYCARDIA: Primary | ICD-10-CM

## 2025-04-02 DIAGNOSIS — B02.29 POST HERPETIC NEURALGIA: ICD-10-CM

## 2025-04-02 PROCEDURE — 1159F MED LIST DOCD IN RCRD: CPT

## 2025-04-02 PROCEDURE — G2211 COMPLEX E/M VISIT ADD ON: HCPCS

## 2025-04-02 PROCEDURE — 3078F DIAST BP <80 MM HG: CPT

## 2025-04-02 PROCEDURE — 1036F TOBACCO NON-USER: CPT

## 2025-04-02 PROCEDURE — 99214 OFFICE O/P EST MOD 30 MIN: CPT

## 2025-04-02 PROCEDURE — 1123F ACP DISCUSS/DSCN MKR DOCD: CPT

## 2025-04-02 PROCEDURE — 3074F SYST BP LT 130 MM HG: CPT

## 2025-04-02 RX ORDER — PREGABALIN 150 MG/1
150 CAPSULE ORAL 2 TIMES DAILY
Qty: 180 CAPSULE | Refills: 0 | Status: SHIPPED | OUTPATIENT
Start: 2025-04-02 | End: 2025-09-29

## 2025-04-02 ASSESSMENT — PATIENT HEALTH QUESTIONNAIRE - PHQ9
1. LITTLE INTEREST OR PLEASURE IN DOING THINGS: NOT AT ALL
SUM OF ALL RESPONSES TO PHQ9 QUESTIONS 1 AND 2: 0
2. FEELING DOWN, DEPRESSED OR HOPELESS: NOT AT ALL

## 2025-04-02 NOTE — PROGRESS NOTES
"Subjective   Patient ID: Estee Medina is a 89 y.o. female who presents for Follow-up (NT PCP 3 month follow up. Here to have refill of Lyrica.).    Patient here today to follow-up regarding Lyrica.  Denies any adverse effects associated with this medication.  Feels that it does address postherpetic neuralgia symptoms.  Does remain to have at times neuropathic symptoms across left face.  No changes in visual acuity.  No recent precipitous worsening of neuropathic symptoms.    Other acute concerns addressed at this time, questioning low heart rate.  States that recently she was at dentist when low heart rate was brought to her attention.  In office today heart rate 54 bpm.  Denies any syncope or presyncope, no significant fatigue dizziness or orthostatic symptoms.  Denies any other arrhythmia.  No chest discomfort         Review of Systems    Objective   /70 (BP Location: Right arm, Patient Position: Sitting)   Pulse 54   Temp 36.5 °C (97.7 °F)   Resp 18   Ht 1.473 m (4' 10\")   Wt 57.6 kg (127 lb)   SpO2 97%   BMI 26.54 kg/m²     Physical Exam  Constitutional:       General: She is not in acute distress.     Appearance: Normal appearance. She is not ill-appearing.   Eyes:      General: No scleral icterus.  Cardiovascular:      Rate and Rhythm: Normal rate and regular rhythm.      Heart sounds: No murmur heard.     No friction rub. No gallop.   Pulmonary:      Effort: Pulmonary effort is normal. No respiratory distress.      Breath sounds: Normal breath sounds. No wheezing, rhonchi or rales.   Musculoskeletal:      Right lower leg: No edema.      Left lower leg: No edema.   Neurological:      General: No focal deficit present.      Mental Status: She is alert and oriented to person, place, and time.   Psychiatric:         Mood and Affect: Mood normal.         Behavior: Behavior normal.         Assessment/Plan   Problem List Items Addressed This Visit             ICD-10-CM    Post herpetic neuralgia " B02.29    Relevant Medications    pregabalin (Lyrica) 150 mg capsule    Bradycardia - Primary R00.1     Asymptomatic, was brought to attention by various health providers  Patient currently taking atenolol, may discuss further with PCP regarding reducing this medication or discontinuing as this may be contributing to low heart rate.  Patient does have excellent blood pressure control between lisinopril and atenolol and given asymptomatic nature to bradycardia which may be a side effect of beta-blocker hesitant to taper this medication.        Reviewed [place]AR[place] and is appropriate  No adverse effects associated with Lyrica and does feel to be sufficiently addressing symptoms.  May consider alpha lipoic acid as supplement for neuropathic symptoms.  Additionally discussed capsaicin however caution regarding use on face.    Patient to follow-up with PCP in 3 months for additional refills.    Nabor Villafana, DO

## 2025-04-03 NOTE — ASSESSMENT & PLAN NOTE
Asymptomatic, was brought to attention by various health providers  Patient currently taking atenolol, may discuss further with PCP regarding reducing this medication or discontinuing as this may be contributing to low heart rate.  Patient does have excellent blood pressure control between lisinopril and atenolol and given asymptomatic nature to bradycardia which may be a side effect of beta-blocker hesitant to taper this medication.

## 2025-04-18 ENCOUNTER — APPOINTMENT (OUTPATIENT)
Dept: CARDIOLOGY | Facility: CLINIC | Age: 89
End: 2025-04-18
Payer: MEDICARE

## 2025-04-18 ENCOUNTER — ANTICOAGULATION - WARFARIN VISIT (OUTPATIENT)
Dept: CARDIOLOGY | Facility: CLINIC | Age: 89
End: 2025-04-18
Payer: MEDICARE

## 2025-04-18 DIAGNOSIS — I48.0 PAROXYSMAL ATRIAL FIBRILLATION (MULTI): Primary | ICD-10-CM

## 2025-04-18 LAB
POC INR: 2.6 (ref 0.9–1.1)
POC PROTHROMBIN TIME: ABNORMAL (ref 9.3–12.5)

## 2025-04-18 PROCEDURE — 99211 OFF/OP EST MAY X REQ PHY/QHP: CPT

## 2025-04-18 PROCEDURE — 85610 PROTHROMBIN TIME: CPT | Mod: QW

## 2025-04-18 NOTE — PROGRESS NOTES
Patient identification verified with 2 identifiers.    Location: [facility] - [address] [place] [id number] [phone]     Referring Physician: Dr. Breanna Herrera  Enrollment/ Re-enrollment date: 2025  INR Goal: 2.0-3.0  INR monitoring is per [facility] protocol.  Anticoagulation Medication: warfarin  Indication: Atrial Fibrillation/Atrial Flutter    Subjective   Bleeding signs/symptoms: No  Bruising: No   Major bleeding event: No  Thrombosis signs/symptoms: No  Thromboembolic event: No  Missed doses: No  Extra doses: No  Medication changes: No  Dietary changes: No  Change in health: No  Change in activity: No  Alcohol: No  Other concerns: No    Upcoming Procedures:  Does the Patient Have any upcoming procedures that require interruption in anticoagulation therapy? no  Does the patient require bridging? no      Anticoagulation Summary  As of 2025      INR goal:  2.0-3.0   TTR:  78.9% (1.5 y)   INR used for dosin.60 (2025)   Weekly warfarin total:  32.5 mg               Assessment/Plan   Therapeutic     1. New dose: no change    2. Next INR: 1 month      Education provided to patient during the visit:  Patient instructed to call in interim with questions, concerns and changes.   Patient educated on interactions between medications and warfarin.   Patient educated on dietary consistency in vitamin k consumption.   Patient educated on affects of alcohol consumption while taking warfarin.   Patient educated on signs of bleeding/clotting.   Patient educated on compliance with dosing, follow up appointments, and prescribed plan of care.

## 2025-04-25 ENCOUNTER — ANTICOAGULATION - WARFARIN VISIT (OUTPATIENT)
Dept: CARDIOLOGY | Facility: CLINIC | Age: 89
End: 2025-04-25
Payer: MEDICARE

## 2025-05-13 ENCOUNTER — APPOINTMENT (OUTPATIENT)
Dept: CARDIOLOGY | Facility: CLINIC | Age: 89
End: 2025-05-13
Payer: MEDICARE

## 2025-05-16 ENCOUNTER — ANTICOAGULATION - WARFARIN VISIT (OUTPATIENT)
Dept: CARDIOLOGY | Facility: CLINIC | Age: 89
End: 2025-05-16
Payer: MEDICARE

## 2025-05-16 DIAGNOSIS — I48.0 PAROXYSMAL ATRIAL FIBRILLATION (MULTI): Primary | ICD-10-CM

## 2025-05-16 LAB
POC INR: 2.4 (ref 2–3)
POC PROTHROMBIN TIME: NORMAL (ref 9.3–12.5)

## 2025-05-16 PROCEDURE — 99211 OFF/OP EST MAY X REQ PHY/QHP: CPT

## 2025-05-16 PROCEDURE — 85610 PROTHROMBIN TIME: CPT | Mod: QW | Performed by: FAMILY MEDICINE

## 2025-05-16 NOTE — PROGRESS NOTES
Patient identification verified with 2 identifiers.    Location: [facility] - [address] [place] [id number] [phone]     Referring Physician: Dr. Breanna Herrera DO  Enrollment/ Re-enrollment date: 2025  INR Goal: 2.0-3.0  INR monitoring is per [facility] protocol.  Anticoagulation Medication: warfarin  Indication: Atrial Fibrillation/Atrial Flutter    Subjective   Pt present for today's appt. With her daughter Elis.    Bleeding signs/symptoms: No.  Pt denies.  Bruising: Yes.  Pt has a yellowish bruise on her Right cheek and mouth are.  Pt had a tooth extracted that was below her gum line.  Was not off warfarin.    Major bleeding event: No  Thrombosis signs/symptoms: No  Thromboembolic event: No  Missed doses: No.  Pt denies.  Extra doses: No  Medication changes: No.  Pt denies.  Dietary changes: No.  Pt denies.  Change in health: No.  Pt denies.  Change in activity: No  Alcohol: No  Other concerns: No    Upcoming Procedures:  Does the Patient Have any upcoming procedures that require interruption in anticoagulation therapy? no  Does the patient require bridging? no    Dose maintained after last visit.  Pt is taking 32.5mg of warfarin weekly.  Anticoagulation Summary  As of 2025      INR goal:  2.0-3.0   TTR:  79.9% (1.6 y)   INR used for dosin.40 (2025)   Weekly warfarin total:  32.5 mg               Assessment/Plan   Therapeutic     1. New dose: no change.  Maintain dose.  32.5mg weekly.  2. Next INR: 1 month.  Can r/s in 4 weeks if therapeutic next visit.  3. Per Pt and daughter, they requested to RTC in 5 weeks instead of 4 weeks recommended per protocol due to transportation availability to clinic.  Pt r/s in 5 weeks.      Education provided to patient during the visit:  Patient instructed to call in interim with questions, concerns and changes.   Patient educated on interactions between medications and warfarin.   Patient educated on dietary consistency in  vitamin k consumption.   Patient educated on affects of alcohol consumption while taking warfarin.   Patient educated on signs of bleeding/clotting.   Patient educated on compliance with dosing, follow up appointments, and prescribed plan of care.

## 2025-06-18 ENCOUNTER — APPOINTMENT (OUTPATIENT)
Dept: PRIMARY CARE | Facility: CLINIC | Age: 89
End: 2025-06-18
Payer: MEDICARE

## 2025-06-18 VITALS
DIASTOLIC BLOOD PRESSURE: 70 MMHG | BODY MASS INDEX: 27.29 KG/M2 | TEMPERATURE: 96.5 F | WEIGHT: 130 LBS | SYSTOLIC BLOOD PRESSURE: 150 MMHG | HEIGHT: 58 IN

## 2025-06-18 DIAGNOSIS — F32.A ANXIETY AND DEPRESSION: ICD-10-CM

## 2025-06-18 DIAGNOSIS — I10 BENIGN HYPERTENSION: ICD-10-CM

## 2025-06-18 DIAGNOSIS — Z51.81 THERAPEUTIC DRUG MONITORING: ICD-10-CM

## 2025-06-18 DIAGNOSIS — F32.5 MAJOR DEPRESSIVE DISORDER IN REMISSION, UNSPECIFIED WHETHER RECURRENT: ICD-10-CM

## 2025-06-18 DIAGNOSIS — E66.3 OVERWEIGHT (BMI 25.0-29.9): ICD-10-CM

## 2025-06-18 DIAGNOSIS — Z00.00 ROUTINE GENERAL MEDICAL EXAMINATION AT HEALTH CARE FACILITY: Primary | ICD-10-CM

## 2025-06-18 DIAGNOSIS — B02.29 POST HERPETIC NEURALGIA: ICD-10-CM

## 2025-06-18 DIAGNOSIS — M81.0 AGE-RELATED OSTEOPOROSIS WITHOUT CURRENT PATHOLOGICAL FRACTURE: ICD-10-CM

## 2025-06-18 DIAGNOSIS — F41.9 ANXIETY AND DEPRESSION: ICD-10-CM

## 2025-06-18 DIAGNOSIS — I48.0 PAROXYSMAL ATRIAL FIBRILLATION (MULTI): ICD-10-CM

## 2025-06-18 DIAGNOSIS — M20.40 HAMMER TOE, UNSPECIFIED LATERALITY: ICD-10-CM

## 2025-06-18 DIAGNOSIS — R26.89 BALANCE PROBLEM: ICD-10-CM

## 2025-06-18 PROCEDURE — G0439 PPPS, SUBSEQ VISIT: HCPCS | Performed by: FAMILY MEDICINE

## 2025-06-18 PROCEDURE — 3077F SYST BP >= 140 MM HG: CPT | Performed by: FAMILY MEDICINE

## 2025-06-18 PROCEDURE — 1159F MED LIST DOCD IN RCRD: CPT | Performed by: FAMILY MEDICINE

## 2025-06-18 PROCEDURE — 1160F RVW MEDS BY RX/DR IN RCRD: CPT | Performed by: FAMILY MEDICINE

## 2025-06-18 PROCEDURE — 3078F DIAST BP <80 MM HG: CPT | Performed by: FAMILY MEDICINE

## 2025-06-18 PROCEDURE — 1170F FXNL STATUS ASSESSED: CPT | Performed by: FAMILY MEDICINE

## 2025-06-18 PROCEDURE — 99213 OFFICE O/P EST LOW 20 MIN: CPT | Performed by: FAMILY MEDICINE

## 2025-06-18 RX ORDER — BISMUTH SUBSALICYLATE 262 MG
1 TABLET,CHEWABLE ORAL DAILY
COMMUNITY

## 2025-06-18 RX ORDER — PREGABALIN 150 MG/1
150 CAPSULE ORAL 2 TIMES DAILY
Qty: 180 CAPSULE | Refills: 0 | Status: SHIPPED | OUTPATIENT
Start: 2025-06-18 | End: 2025-12-15

## 2025-06-18 ASSESSMENT — PATIENT HEALTH QUESTIONNAIRE - PHQ9
SUM OF ALL RESPONSES TO PHQ9 QUESTIONS 1 AND 2: 0
2. FEELING DOWN, DEPRESSED OR HOPELESS: NOT AT ALL
1. LITTLE INTEREST OR PLEASURE IN DOING THINGS: NOT AT ALL

## 2025-06-18 ASSESSMENT — ACTIVITIES OF DAILY LIVING (ADL)
MANAGING_FINANCES: INDEPENDENT
DRESSING: INDEPENDENT
TAKING_MEDICATION: INDEPENDENT
DOING_HOUSEWORK: INDEPENDENT
GROCERY_SHOPPING: NEEDS ASSISTANCE
BATHING: NEEDS ASSISTANCE

## 2025-06-18 NOTE — PROGRESS NOTES
"Subjective   Reason for Visit: Estee Medina is an 89 y.o. female here for a Medicare Wellness visit.     Past Medical, Surgical, and Family History reviewed and updated in chart.         HPI    Has home aid who comes 2 times weekly  Balance issues    Patient Care Team:  Breanna Herrera DO as PCP - General  Breanna Herrera DO as PCP - [employer] Medicare Advantage PCP     Review of Systems   Constitutional:  Negative for activity change.   Eyes:  Positive for visual disturbance.   Respiratory:  Negative for cough.    Cardiovascular:  Negative for chest pain.   Gastrointestinal:  Negative for abdominal pain.   Musculoskeletal:         Balance difficulties       Objective   Vitals:  /70 (BP Location: Right arm, Patient Position: Sitting)   Temp 35.8 °C (96.5 °F)   Ht 1.473 m (4' 10\")   Wt 59 kg (130 lb)   BMI 27.17 kg/m²       Physical Exam  Constitutional:       Appearance: Normal appearance.   HENT:      Head: Normocephalic and atraumatic.      Nose: Nose normal.   Eyes:      Conjunctiva/sclera: Conjunctivae normal.   Cardiovascular:      Rate and Rhythm: Normal rate and regular rhythm.      Pulses: Normal pulses.   Pulmonary:      Effort: Pulmonary effort is normal.      Breath sounds: Normal breath sounds.   Abdominal:      General: Abdomen is flat.      Palpations: Abdomen is soft.   Skin:     General: Skin is warm.      Capillary Refill: Capillary refill takes less than 2 seconds.   Neurological:      Mental Status: She is alert.   Psychiatric:         Mood and Affect: Mood normal.         Assessment & Plan  Therapeutic drug monitoring    Orders:    Drug Screen, Urine With Reflex to Confirmation    Pregabalin,Urine    Routine general medical examination at health care facility    Orders:    1 Year Follow Up In Primary Care - Wellness Exam    1 Year Follow Up In Primary Care - Wellness Exam; Future    Balance problem    Orders:    Walker with Seat    Post herpetic neuralgia    Orders:    pregabalin " (Lyrica) 150 mg capsule; Take 1 capsule (150 mg) by mouth 2 times a day.    Benign hypertension  - not well controlled in office        Paroxysmal atrial fibrillation (Multi)  - chronic, on anticoagulation       Major depressive disorder in remission, unspecified whether recurrent  - chronic and stable/improved       Age-related osteoporosis without current pathological fracture  - chronic       Overweight (BMI 25.0-29.9)  - continue efforts at healthy diet and exercise       Anxiety and depression  - chronic and stable       Hammer toe, unspecified laterality    Orders:    Referral to Podiatry; Future

## 2025-06-20 ENCOUNTER — ANTICOAGULATION - WARFARIN VISIT (OUTPATIENT)
Dept: CARDIOLOGY | Facility: CLINIC | Age: 89
End: 2025-06-20
Payer: MEDICARE

## 2025-06-20 DIAGNOSIS — I48.0 PAROXYSMAL ATRIAL FIBRILLATION (MULTI): Primary | ICD-10-CM

## 2025-06-20 LAB
AMPHETAMINES UR QL: NEGATIVE NG/ML
BARBITURATES UR QL: NEGATIVE NG/ML
BENZODIAZ UR QL: NEGATIVE NG/ML
BZE UR QL: NEGATIVE NG/ML
CREAT UR-MCNC: 49.9 MG/DL
DRUG SCREEN COMMENT UR-IMP: ABNORMAL
FENTANYL UR QL SCN: NEGATIVE NG/ML
METHADONE UR QL: NEGATIVE NG/ML
OPIATES UR QL: NEGATIVE NG/ML
OXIDANTS UR QL: NEGATIVE MCG/ML
OXYCODONE UR QL: NEGATIVE NG/ML
PCP UR QL: NEGATIVE NG/ML
PH UR: 5.6 [PH] (ref 4.5–9)
POC INR: 2.3 (ref 2–3)
POC PROTHROMBIN TIME: NORMAL (ref 9.3–12.5)
PREGABALIN UR CFM-MCNC: ABNORMAL NG/ML
QUEST NOTES AND COMMENTS: ABNORMAL
THC UR QL: NEGATIVE NG/ML

## 2025-06-20 PROCEDURE — 99211 OFF/OP EST MAY X REQ PHY/QHP: CPT

## 2025-06-20 PROCEDURE — 85610 PROTHROMBIN TIME: CPT | Mod: QW | Performed by: FAMILY MEDICINE

## 2025-06-20 NOTE — PROGRESS NOTES
Patient identification verified with 2 identifiers.    Location: [facility] - [address] [place] [address] [phone]     Referring Physician: Dr. Breanna Herrera DO  Enrollment/ Re-enrollment date: 2025  INR Goal: 2.0-3.0  INR monitoring is per [facility] protocol.  Anticoagulation Medication: warfarin  Indication: Atrial Fibrillation/Atrial Flutter    Subjective   Pt present for today's appt. With her daughter Elis.    Bleeding signs/symptoms: No.  Pt denies.    Bruising: No.  Pt denies.    Major bleeding event: No  Thrombosis signs/symptoms: No  Thromboembolic event: No  Missed doses: No.  Pt denies.  Extra doses: No  Medication changes: No.  Pt denies.  Dietary changes: No.  Pt denies.  Change in health: No.  Pt denies.  Change in activity: No.  Pt denies.  Alcohol: No.  Pt will drink wine on occasion.  Other concerns: No    Upcoming Procedures:  Does the Patient Have any upcoming procedures that require interruption in anticoagulation therapy? no  Does the patient require bridging? no    Dose maintained after last visit.  Pt is taking 32.5mg of warfarin weekly.  Anticoagulation Summary  As of 2025      INR goal:  2.0-3.0   TTR:  81.0% (1.7 y)   INR used for dosin.30 (2025)   Weekly warfarin total:  32.5 mg               Assessment/Plan   Therapeutic     1. New dose: no change.  Maintain dose.  32.5mg weekly.  2. Next INR: 1 month.  Can r/s in 4 weeks if therapeutic next visit.  3. Per Pt and daughter, they requested to RTC in 5 weeks instead of 4 weeks recommended per protocol due to transportation availability to clinic.  Pt r/s in 5 weeks.      Education provided to patient during the visit:  Patient instructed to call in interim with questions, concerns and changes.   Patient educated on interactions between medications and warfarin.   Patient educated on dietary consistency in vitamin k consumption.   Patient educated on affects of alcohol consumption while  taking warfarin.   Patient educated on signs of bleeding/clotting.   Patient educated on compliance with dosing, follow up appointments, and prescribed plan of care.

## 2025-06-24 ASSESSMENT — ENCOUNTER SYMPTOMS
COUGH: 0
ABDOMINAL PAIN: 0
ACTIVITY CHANGE: 0

## 2025-07-16 ENCOUNTER — APPOINTMENT (OUTPATIENT)
Dept: PODIATRY | Facility: CLINIC | Age: 89
End: 2025-07-16
Payer: MEDICARE

## 2025-07-16 DIAGNOSIS — B35.1 ONYCHOMYCOSIS: Primary | ICD-10-CM

## 2025-07-16 DIAGNOSIS — M79.675 PAIN IN TOES OF BOTH FEET: ICD-10-CM

## 2025-07-16 DIAGNOSIS — L60.2 ONYCHOGRYPHOSIS: ICD-10-CM

## 2025-07-16 DIAGNOSIS — M79.674 PAIN IN TOES OF BOTH FEET: ICD-10-CM

## 2025-07-16 PROCEDURE — 1160F RVW MEDS BY RX/DR IN RCRD: CPT | Performed by: PODIATRIST

## 2025-07-16 PROCEDURE — 11721 DEBRIDE NAIL 6 OR MORE: CPT | Performed by: PODIATRIST

## 2025-07-16 PROCEDURE — 1159F MED LIST DOCD IN RCRD: CPT | Performed by: PODIATRIST

## 2025-07-16 PROCEDURE — 99203 OFFICE O/P NEW LOW 30 MIN: CPT | Performed by: PODIATRIST

## 2025-07-16 PROCEDURE — 1036F TOBACCO NON-USER: CPT | Performed by: PODIATRIST

## 2025-07-16 NOTE — PROGRESS NOTES
History Of Present Illness  Estee Medina is a 89 y.o. female presenting with chief complaint of bilateral painful nails bilaterally.  She states they are thick and difficult to cut.  They are causing pain especially in shoe gear.  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    Surgical History  She has a past surgical history that includes Other surgical history (01/08/2019); Other surgical history (01/08/2019); Other surgical history (01/08/2019); and Other surgical history (01/08/2019).     Social History  She reports that she has never smoked. She has never been exposed to tobacco smoke. She has never used smokeless tobacco. She reports current alcohol use. She reports that she does not use drugs.    Family History  Family History[1]     Allergies  Alendronate, Alendronic acid, Sertraline, Sulfasalazine, Amlodipine, Buspirone, Hydrochlorothiazide, Sulfa (sulfonamide antibiotics), Ciprofloxacin, and Lovastatin    Medications  Current Medications[2]    Review of Systems    REVIEW OF SYSTEMS  GENERAL:  Negative for malaise, significant weight loss, fever      Objective:   Vasc: DP and PT pulses are palpable bilateral.  CFT is less than 3 seconds bilateral.  Skin temperature is warm to cool proximal to distal  bilateral.      Neuro:  Light touch is intact to the foot bilateral.      Derm: Nails 1 through 5 bilaterally show evidence of onychogryphosis and significant thickening.  There is subungual debris noted as well.  No acute ingrowth.  No bacterial signs of infection    Ortho: There is pain with dorsal plantar compression of nails 1 through 5 bilaterally    Assessment/Plan     Diagnoses and all orders for this visit:  Onychomycosis  Onychogryphosis  Pain in toes of both feet  Other orders  -     Referral to Podiatry      The patient is evaluated and examined.  She has thickened and gryphotic nails which are causing pain in shoe gear.  She understands she does not have class findings and therefore may not qualify for routine nail debridement.  Per her request nails were debrided in length and thickness today.  She was originally referred for a hammertoe deformity.  I do not see significant hammertoe deformities to the lesser digits.  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  She was inquiring on how to manage this.  She understands unfortunately this can only be fixed with surgery which I do not recommend.  She is agreeable.    Mary Chanel DPM       [1]   Family History  Problem Relation Name Age of Onset    Other (malignant neoplam) Mother      Diabetes Father      Diabetes Sister      Other (cardiac disorder) Sister      Alcohol abuse Daughter     [2]   Current Outpatient Medications   Medication Sig Dispense Refill    acetaminophen (Tylenol Extra Strength) 500 mg tablet Take 1 tablet (500 mg) by mouth 2 times a day as needed for mild pain (1 - 3).      atenolol (Tenormin) 50 mg tablet Take 1 tablet (50 mg) by mouth once daily. as directed 90 tablet 3    brimonidine-timoloL (Combigan) 0.2-0.5 % ophthalmic solution Administer 1 drop into both eyes every 12 hours.      latanoprost (Xalatan) 0.005 % ophthalmic solution Administer 1 drop into both eyes once daily at bedtime.      lisinopril  40 mg tablet Take 1 tablet (40 mg) by mouth once daily. 90 tablet 3    multivitamin tablet Take 1 tablet by mouth once daily.      pregabalin (Lyrica) 150 mg capsule Take 1 capsule (150 mg) by mouth 2 times a day. 180 capsule 0    simvastatin (Zocor) 20 mg tablet Take 1 tablet (20 mg) by mouth once daily at bedtime. 90 tablet 3    UNABLE TO FIND Apply 1 Application topically 2 times a day. BLUE-EMU super strength      warfarin (Coumadin) 5 mg tablet Take 1 tablet (5 mg) by mouth once daily in the evening. 90 tablet 3     No current facility-administered medications for this visit.

## 2025-07-25 ENCOUNTER — ANTICOAGULATION - WARFARIN VISIT (OUTPATIENT)
Dept: CARDIOLOGY | Facility: CLINIC | Age: 89
End: 2025-07-25
Payer: MEDICARE

## 2025-07-25 DIAGNOSIS — I48.0 PAROXYSMAL ATRIAL FIBRILLATION (MULTI): Primary | ICD-10-CM

## 2025-07-25 LAB
POC INR: 2.9 (ref 2–3)
POC PROTHROMBIN TIME: NORMAL (ref 9.3–12.5)

## 2025-07-25 PROCEDURE — 85610 PROTHROMBIN TIME: CPT | Mod: QW | Performed by: FAMILY MEDICINE

## 2025-07-25 PROCEDURE — 99211 OFF/OP EST MAY X REQ PHY/QHP: CPT

## 2025-07-25 NOTE — PROGRESS NOTES
Patient identification verified with 2 identifiers.    Location: [facility] - [address] [place] [address] [phone]     Referring Physician: Dr. Breanna Herrera DO  Enrollment/ Re-enrollment date: 2025  INR Goal: 2.0-3.0  INR monitoring is per [facility] protocol.  Anticoagulation Medication: warfarin  Indication: Atrial Fibrillation/Atrial Flutter    Subjective   Pt present for today's appt. With her daughter Elis.    Bleeding signs/symptoms: No.  Pt denies.    Bruising: No.  Pt denies.    Major bleeding event: No  Thrombosis signs/symptoms: No  Thromboembolic event: No  Missed doses: No.  Pt denies.  Extra doses: No  Medication changes: No.  Pt denies.  Dietary changes: Yes.  Ptstates that she consumed less green than usual this past week.  Change in health: No.  Pt denies.  Change in activity: No.  Pt denies.  Alcohol: No.  Pt will drink wine on occasion.  Other concerns: No    Upcoming Procedures:  Does the Patient Have any upcoming procedures that require interruption in anticoagulation therapy? no  Does the patient require bridging? no    Dose maintained after last visit.  Pt is taking 32.5mg of warfarin weekly.  Anticoagulation Summary  As of 2025      INR goal:  2.0-3.0   TTR:  82.0% (1.8 y)   INR used for dosin.90 (2025)   Weekly warfarin total:  32.5 mg               Assessment/Plan   Therapeutic     1. New dose: no change.  Maintain dose.  32.5mg weekly.  2. Next INR: 1 month.  Can r/s in 4 weeks if therapeutic next visit.  3. Per Pt and daughter, they requested to RTC in 5 weeks instead of 4 weeks recommended per protocol due to transportation availability to clinic.  Pt r/s in 5 weeks.      Education provided to patient during the visit:  Patient instructed to call in interim with questions, concerns and changes.   Patient educated on interactions between medications and warfarin.   Patient educated on dietary consistency in vitamin k consumption.    Patient educated on affects of alcohol consumption while taking warfarin.   Patient educated on signs of bleeding/clotting.   Patient educated on compliance with dosing, follow up appointments, and prescribed plan of care.

## 2025-08-29 ENCOUNTER — ANTICOAGULATION - WARFARIN VISIT (OUTPATIENT)
Dept: CARDIOLOGY | Facility: CLINIC | Age: 89
End: 2025-08-29
Payer: MEDICARE

## 2025-08-29 DIAGNOSIS — I48.0 PAROXYSMAL ATRIAL FIBRILLATION (MULTI): Primary | ICD-10-CM

## 2025-08-29 LAB
POC INR: 2.6 (ref 2–3)
POC PROTHROMBIN TIME: ABNORMAL (ref 9.3–12.5)

## 2025-08-29 PROCEDURE — 99211 OFF/OP EST MAY X REQ PHY/QHP: CPT

## 2025-08-29 PROCEDURE — 85610 PROTHROMBIN TIME: CPT | Mod: QW | Performed by: FAMILY MEDICINE

## 2025-09-23 ENCOUNTER — APPOINTMENT (OUTPATIENT)
Dept: PRIMARY CARE | Facility: CLINIC | Age: 89
End: 2025-09-23
Payer: MEDICARE